# Patient Record
Sex: MALE | Race: OTHER | URBAN - METROPOLITAN AREA
[De-identification: names, ages, dates, MRNs, and addresses within clinical notes are randomized per-mention and may not be internally consistent; named-entity substitution may affect disease eponyms.]

---

## 2021-10-25 RX ORDER — AMLODIPINE, VALSARTAN AND HYDROCHLOROTHIAZIDE 5; 160; 12.5 MG/1; MG/1; MG/1
TABLET ORAL
COMMUNITY
End: 2021-10-26

## 2021-10-26 ENCOUNTER — APPOINTMENT (OUTPATIENT)
Dept: CT IMAGING | Facility: HOSPITAL | Age: 61
DRG: 066 | End: 2021-10-26
Attending: EMERGENCY MEDICINE
Payer: COMMERCIAL

## 2021-10-26 ENCOUNTER — HOSPITAL ENCOUNTER (INPATIENT)
Facility: HOSPITAL | Age: 61
LOS: 6 days | Discharge: HOME | DRG: 066 | End: 2021-11-01
Attending: EMERGENCY MEDICINE | Admitting: INTERNAL MEDICINE
Payer: COMMERCIAL

## 2021-10-26 ENCOUNTER — APPOINTMENT (OUTPATIENT)
Dept: CV DIAGNOSTICS | Facility: HOSPITAL | Age: 61
DRG: 066 | End: 2021-10-26
Attending: HOSPITALIST
Payer: COMMERCIAL

## 2021-10-26 DIAGNOSIS — I63.9 CEREBELLAR STROKE (HCC): Primary | ICD-10-CM

## 2021-10-26 PROCEDURE — 99223 1ST HOSP IP/OBS HIGH 75: CPT | Performed by: OTHER

## 2021-10-26 PROCEDURE — 93306 TTE W/DOPPLER COMPLETE: CPT | Performed by: HOSPITALIST

## 2021-10-26 PROCEDURE — 70450 CT HEAD/BRAIN W/O DYE: CPT | Performed by: EMERGENCY MEDICINE

## 2021-10-26 RX ORDER — HYDRALAZINE HYDROCHLORIDE 20 MG/ML
10 INJECTION INTRAMUSCULAR; INTRAVENOUS EVERY 2 HOUR PRN
Status: DISCONTINUED | OUTPATIENT
Start: 2021-10-26 | End: 2021-11-01

## 2021-10-26 RX ORDER — ATORVASTATIN CALCIUM 80 MG/1
80 TABLET, FILM COATED ORAL NIGHTLY
Status: DISCONTINUED | OUTPATIENT
Start: 2021-10-26 | End: 2021-11-01

## 2021-10-26 RX ORDER — MECLIZINE HYDROCHLORIDE 25 MG/1
50 TABLET ORAL ONCE
Status: COMPLETED | OUTPATIENT
Start: 2021-10-26 | End: 2021-10-26

## 2021-10-26 RX ORDER — ONDANSETRON 2 MG/ML
4 INJECTION INTRAMUSCULAR; INTRAVENOUS EVERY 6 HOURS PRN
Status: DISCONTINUED | OUTPATIENT
Start: 2021-10-26 | End: 2021-10-26

## 2021-10-26 RX ORDER — ASPIRIN 81 MG/1
324 TABLET, CHEWABLE ORAL ONCE
Status: COMPLETED | OUTPATIENT
Start: 2021-10-26 | End: 2021-10-26

## 2021-10-26 RX ORDER — ACETAMINOPHEN 325 MG/1
650 TABLET ORAL EVERY 6 HOURS PRN
Status: DISCONTINUED | OUTPATIENT
Start: 2021-10-26 | End: 2021-10-26

## 2021-10-26 RX ORDER — DOCUSATE SODIUM 100 MG/1
100 CAPSULE, LIQUID FILLED ORAL 2 TIMES DAILY PRN
Status: DISCONTINUED | OUTPATIENT
Start: 2021-10-26 | End: 2021-11-01

## 2021-10-26 RX ORDER — ASPIRIN 300 MG
300 SUPPOSITORY, RECTAL RECTAL DAILY
Status: DISCONTINUED | OUTPATIENT
Start: 2021-10-27 | End: 2021-11-01

## 2021-10-26 RX ORDER — ONDANSETRON 2 MG/ML
4 INJECTION INTRAMUSCULAR; INTRAVENOUS EVERY 4 HOURS PRN
Status: DISCONTINUED | OUTPATIENT
Start: 2021-10-26 | End: 2021-10-26

## 2021-10-26 RX ORDER — ONDANSETRON 2 MG/ML
4 INJECTION INTRAMUSCULAR; INTRAVENOUS EVERY 6 HOURS PRN
Status: DISCONTINUED | OUTPATIENT
Start: 2021-10-26 | End: 2021-11-01

## 2021-10-26 RX ORDER — ASPIRIN 325 MG
325 TABLET ORAL DAILY
Status: DISCONTINUED | OUTPATIENT
Start: 2021-10-27 | End: 2021-11-01

## 2021-10-26 RX ORDER — PROCHLORPERAZINE EDISYLATE 5 MG/ML
5 INJECTION INTRAMUSCULAR; INTRAVENOUS EVERY 8 HOURS PRN
Status: DISCONTINUED | OUTPATIENT
Start: 2021-10-26 | End: 2021-11-01

## 2021-10-26 RX ORDER — HEPARIN SODIUM 5000 [USP'U]/ML
5000 INJECTION, SOLUTION INTRAVENOUS; SUBCUTANEOUS EVERY 8 HOURS SCHEDULED
Status: DISCONTINUED | OUTPATIENT
Start: 2021-10-26 | End: 2021-11-01

## 2021-10-26 RX ORDER — SODIUM CHLORIDE 9 MG/ML
INJECTION, SOLUTION INTRAVENOUS CONTINUOUS
Status: ACTIVE | OUTPATIENT
Start: 2021-10-26 | End: 2021-10-28

## 2021-10-26 RX ORDER — POTASSIUM CHLORIDE 20 MEQ/1
40 TABLET, EXTENDED RELEASE ORAL EVERY 4 HOURS
Status: COMPLETED | OUTPATIENT
Start: 2021-10-26 | End: 2021-10-26

## 2021-10-26 RX ORDER — METOCLOPRAMIDE HYDROCHLORIDE 5 MG/ML
10 INJECTION INTRAMUSCULAR; INTRAVENOUS EVERY 8 HOURS PRN
Status: DISCONTINUED | OUTPATIENT
Start: 2021-10-26 | End: 2021-10-26

## 2021-10-26 RX ORDER — AMLODIPINE BESYLATE 5 MG/1
5 TABLET ORAL DAILY
Status: ON HOLD | COMMUNITY
End: 2021-10-26

## 2021-10-26 RX ORDER — ACETAMINOPHEN 325 MG/1
650 TABLET ORAL EVERY 4 HOURS PRN
Status: DISCONTINUED | OUTPATIENT
Start: 2021-10-26 | End: 2021-11-01

## 2021-10-26 RX ORDER — SODIUM CHLORIDE 9 MG/ML
INJECTION, SOLUTION INTRAVENOUS CONTINUOUS
Status: ACTIVE | OUTPATIENT
Start: 2021-10-26 | End: 2021-10-26

## 2021-10-26 RX ORDER — ACETAMINOPHEN 650 MG/1
650 SUPPOSITORY RECTAL EVERY 4 HOURS PRN
Status: DISCONTINUED | OUTPATIENT
Start: 2021-10-26 | End: 2021-11-01

## 2021-10-26 RX ORDER — LABETALOL HYDROCHLORIDE 5 MG/ML
10 INJECTION, SOLUTION INTRAVENOUS EVERY 10 MIN PRN
Status: DISCONTINUED | OUTPATIENT
Start: 2021-10-26 | End: 2021-11-01

## 2021-10-26 NOTE — SLP NOTE
ADULT SWALLOWING EVALUATION    ASSESSMENT    ASSESSMENT/OVERALL IMPRESSION:  Patient seen for swallowing evaluation per stroke protocol. Patient admitted due to headache and feeling off balance.   He reports symptoms began 4 days ago while in [facility].  He d could represent acute or subacute infarcts. Antoni Donning is no intracranial hemorrhage.  Further evaluation with    MRI is recommended.       Preliminary report was reviewed and there is no significant discrepancy.               Dictated by (CST): Huber Roberts,

## 2021-10-26 NOTE — PLAN OF CARE
Received from ED earlier. Alert, awake. C/o dizziness, blurry vision and tingling to bilateral hand fingers. Denies pain. Tele SB with rates in mid 46s.  Per patient stopped taking prescribed b/p medication months ago as he felt he did not need it anymore

## 2021-10-26 NOTE — ED QUICK NOTES
Pt asleep but easily aroused, pt with clear speech, answering questions appropriately, skin w/d,resps appear unlabored. Pt with empty bag of NS hanging, tubing removed. Pt states he is hungry. Pt aware he will be going to floor shortly.

## 2021-10-26 NOTE — ED PROVIDER NOTES
Patient Seen in: [facility] Emergency Department      History   Patient presents with:  Headache    Stated Complaint: headache, unsteady on feet    Subjective:   HPI    77-year-old male presents reporting a headache and dizziness.  Symptoms began 4 d Sclerae anicteric. Conjunctivae show no pallor. Oropharynx clear, mucous membranes moist   Neck: supple, no rigidity   Lungs: good air exchange and clear   Heart: regular rate rhythm and no murmur   Abdomen: Soft and nontender. No abdominal masses.   No mass-effect, or abnormal extra-axial fluid collections. This is as interpreted by Dr. Elex Rubinstein MD from vision radiology           MDM      25-year-old male presents with 4 days of dizziness that he describes as spinning.  He also reports a frontal headache

## 2021-10-26 NOTE — PROGRESS NOTES
[id number] [facility] Neurology Preliminary Note    Estefany Aguilera Patient Status:  Inpatient    1960 MRN [id number]   [facility] 0SW-A Attending Suzy Burgess MD   Hosp Day # 0 PCP Laure Pereira MD     [facility] rectal suppository 650 mg, 650 mg, Rectal, Q4H PRN  Labetalol HCl (TRANDATE) injection 10 mg, 10 mg, Intravenous, Q10 Min PRN  hydrALAzine HCl (APRESOLINE) injection 10 mg, 10 mg, Intravenous, Q2H PRN  docusate sodium (COLACE) cap 100 mg, 100 mg, Oral, BID  Findings could represent acute or subacute infarcts.  There is no intracranial hemorrhage.  Further evaluation with    MRI is recommended.        Impression    Bilateral cerebellar infarcts R>L on CT   Hypertension     Plan:   Ischemic stroke order set in

## 2021-10-26 NOTE — PAYOR COMM NOTE
--------------  ADMISSION REVIEW     Payor: ELZA LAY  Subscriber #:  [id number]  Authorization Number: [id number]    Admit date: 10/26/21  Admit time:  9:44 AM     Patient Seen in: [facility] Emergency Department    History   Stated Complaint: heada nonfocal. Normal finger-to-nose. Pupils equal, round, reactive bilaterally. Extraocular movement intact. No facial droop. No slurred speech. Normal strength and sensation throughout.     Labs Reviewed   COMP METABOLIC PANEL (14) - Abnormal; Notable for the agrees with plan for hospitalization. Neurology placed on consult for evaluation. Disposition and Plan   Clinical Impression:  Cerebellar stroke ([facility] [address]  (primary encounter diagnosis)        NURSING:  Alert, awake.  C/o dizziness, blurry vision and tingli III, IV, VI: EOMI, no nystagmus  CN V: Facial sensation normal    CN VII: Symmetric facial movement   CN VIII: Normal hearing   CN IX, XI: uvula and palate elevate symmetrically    CN XI: shoulder shrug equal    CN XII: tongue midline  Motor: No drift, no Ivana Chowdhury RN      sodium chloride 0.9% IV bolus 1,000 mL     Date Action Dose Route User    10/26/2021 0239 New Bag 1,000 mL Intravenous Doyce TAMIA Neal      sodium chloride 0.9% IV bolus 500 mL     Date Action Dose Route User    10/26/2021 1

## 2021-10-26 NOTE — H&P
[facility] Hospitalist History and Physical      Patient presents with:  Headache       PCP: Omayra Freeman MD      History of Present Illness: Patient is a 59year old male with PMH sig for HTN, BPH presents for eval of HA and dizziness.       Sx started 4 day distended   Extremities: Extremities normal, atraumatic, no cyanosis or edema. Skin: Skin color, texture, turgor normal. No rashes or lesions.     Neurologic: Normal strength, no focal deficit appreciated     Data Review:    LABS:   Lab Results   Componen hemispheres bilaterally is noted greater on the right than the left. Findings could represent acute or subacute infarcts. There is no intracranial hemorrhage. Further evaluation with  MRI is recommended.   Preliminary report was reviewed and there is no

## 2021-10-26 NOTE — CONSULTS
[id number] [facility] Neurology Initial Consultation    Iqrachristian Retana Patient Status:  Inpatient    1960 MRN [id number]   [facility] 0SW-A Attending Ann Wright MD   Hosp Day # 0 PCP Norma Carmen MD     [facility] Q8H [address]  Prochlorperazine Edisylate (COMPAZINE) injection 5 mg, 5 mg, Intravenous, Q8H PRN  0.9% NaCl infusion, , Intravenous, Continuous  acetaminophen (TYLENOL) tab 650 mg, 650 mg, Oral, Q4H PRN   Or  acetaminophen (Tylenol) rectal suppository 650 mg, 65 attenuation in inferior cerebellar hemispheres bilaterally is noted greater on the right than the left.  Findings could represent acute or subacute infarcts. Annabelle Dragon is no intracranial hemorrhage.  Further evaluation with    MRI is recommended.      CT BRAIN

## 2021-10-27 ENCOUNTER — APPOINTMENT (OUTPATIENT)
Dept: MRI IMAGING | Facility: HOSPITAL | Age: 61
DRG: 066 | End: 2021-10-27
Attending: HOSPITALIST
Payer: COMMERCIAL

## 2021-10-27 PROCEDURE — 70549 MR ANGIOGRAPH NECK W/O&W/DYE: CPT | Performed by: HOSPITALIST

## 2021-10-27 PROCEDURE — 70553 MRI BRAIN STEM W/O & W/DYE: CPT | Performed by: HOSPITALIST

## 2021-10-27 PROCEDURE — 70544 MR ANGIOGRAPHY HEAD W/O DYE: CPT | Performed by: HOSPITALIST

## 2021-10-27 PROCEDURE — 99233 SBSQ HOSP IP/OBS HIGH 50: CPT | Performed by: OTHER

## 2021-10-27 RX ORDER — SODIUM CHLORIDE 9 MG/ML
INJECTION, SOLUTION INTRAVENOUS
Status: COMPLETED | OUTPATIENT
Start: 2021-10-28 | End: 2021-10-28

## 2021-10-27 NOTE — PLAN OF CARE
Assumed care at [address]ny Guzman pt on bed, on room air  Still with blurry vision, denies tingling sensation on fingers  IVF.   K+replaced  Need MRI/MRA head  PT/OT to see  Problem: Patient/Family Goals  Goal: Patient/Family Long Term Goal  Description: Patient's

## 2021-10-27 NOTE — CONSULTS
[facility] Cardiology Consultation    Amanda Cagle Patient Status:  Inpatient    1960 MRN [id number]   [facility] 0SW-A Attending Valentino Papas, *   Hosp Day # 1 PCP Jasmyn Griffin MD     Cardiology attending:  Seen and examined. 123/68    Last 3 Weights  10/25/21 2035 : 135 lb (61.2 kg)          General: No apparent distress  Neck: supple. JVP normal  Cardiac: Regular rhythm, S1, S2 normal,   No rub or gallop. No murmur. Lungs: CTA  Abdomen: Soft, non-tender.    Extremities: No e services    Mild transaminitis   - trend LFT's   - managed per Hospitalist services    Plan:  - OBDULIO tomorrow. We spoke at length regarding the need for transesophageal echocardiography.   The risks were discussed and included but not limited to those relat

## 2021-10-27 NOTE — PHYSICAL THERAPY NOTE
PHYSICAL THERAPY EVALUATION - INPATIENT     Room Number: 0012/0012-A  Evaluation Date: 10/27/2021  Type of Evaluation: Initial  Physician Order: PT Eval and Treat    Presenting Problem: Headache, dizziness, impaired balance  Reason for Therapy: Mobil Unable to rate  Location: headache  Management Techniques:  Activity promotion;Repositioning    COGNITION  · Overall Cognitive Status:  CAMILLE - unable to assess  · Orientation Level:  oriented x4  · Following Commands:  follows multistep commands with increas steps with a railing?: A Lot       AM-PAC Score:  Raw Score: 17   Approx Degree of Impairment: 50.57%   Standardized Score (AM-PAC Scale): 42.13   CMS Modifier (G-Code): CK    FUNCTIONAL ABILITY STATUS  Gait Assessment   Gait Assistance: Minimum assistance BPH  .  In this PT evaluation, the patient presents with the following impairments headache, decreased balance, coordination, vision. Functional outcome measures completed include [facility] modified qamar.   Based on this evaluation, patient's clinical presen

## 2021-10-27 NOTE — PLAN OF CARE
Pt A&Ox4, VSS, afebrile, denies pain. Neuro checks q4hr, pt c/o dizziness, tingling to bilateral hands and fingers, NIH 1. Pt had MRI/MRA brain today, results called to neurology. Pt evaluated by PT/OT today, recommend acute rehab.  IVF infusing, stroke pro

## 2021-10-27 NOTE — CM/SW NOTE
SW met with pt and spoke to him using the ALDO Steward- primary language in [place] Islander. Explained role of sw and plan for [place] and he is in agreement. 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   His brother will be here tomorrow and

## 2021-10-27 NOTE — PROGRESS NOTES
[id number] [facility] Neurology Progress Note    Christina Davis Patient Status:  Inpatient    1960 MRN [id number]   [facility] 0SW-A Attending Anum Milligan, *   Hosp Day # 1 PCP Omayra Freeman MD     Subjective:  Viral Santana light touch, pinprick, vibration and proprioception  Coord: FNF intact with no tremor or dysmetria; rapid alternating movements slower on right side but no ataxia noted  Romberg:deferred  Gait: deferred    Labs:  Reviewed in EPIC;   Lab Results   Component attenuation in inferior cerebellar hemispheres bilaterally is noted greater on the right than the left. Findings could represent acute or subacute infarcts. There is no intracranial hemorrhage. Further evaluation with  MRI is recommended.   Preliminary r by provocative maneuvers. Impressions:  No previous study was available for comparison.             Assessment:    Acute ischemic stroke: bilateral cerebellar stroke, with bilateral PICA stenosis vs occlusion, but patent basilar and patent vertebral - s

## 2021-10-27 NOTE — CM/SW NOTE
PT/OT rec Acute rehab- pt admitted to [facility] due to new stroke. PMR consulted. HOME SITUATION  Type of Home: House   Home Layout: Two level  Stairs to Bedroom:  (flight)  Railing:  Yes     Lives With: Alone  Drives: Yes  Patient Owned Equipment: None  Pa

## 2021-10-27 NOTE — OCCUPATIONAL THERAPY NOTE
OCCUPATIONAL THERAPY EVALUATION - INPATIENT     Room Number: 0012/0012-A  Evaluation Date: 10/27/2021  Type of Evaluation: Initial  Presenting Problem: Cerebellar stroke    Physician Order: IP Consult to Occupational Therapy  Reason for Therapy: ADL/IADL D Findings could represent acute or subacute infarcts. There is no intracranial hemorrhage. Further evaluation with  MRI is recommended. Preliminary report was reviewed and there is no significant discrepancy.     Dictated by (CST): Jesús Kate MD on 10/ vision with headache    PERCEPTION  Overall Perception Status:   WFL - within functional limits    SENSATION  reports n/t in angela fingers    Communication: Pt able to communicate basic wants and needs without difficulty.     Behavioral/Emotional/Social: Pt i planning. Pt rec'd supine in bed, pleasant and motivated to participate despite report of headache. Supine<>sit with MIN A. Pt reports dizziness with sitting EOB. BP taken and 138/87.    Pt able to don and tie shoes with MIN assist.  Sit<>stand, function Complexity  Occupational Profile/Medical History MODERATE - Expanded review of history including review of medical or therapy record   Specific performance deficits impacting engagement in ADL/IADL MODERATE  3 - 5 performance deficits   Client Assessment/P

## 2021-10-27 NOTE — PROGRESS NOTES
[facility] Hospitalist Progress Note     PCP: Bong Willard MD    CC:  Follow up    SUBJECTIVE:  Pt sitting up in bed, worked with PT. [place] [facility] while walking around. Mild blurry vision.  Feels off balance when walking    dw TAMIA Pleitez  OBJECTIVE:  Temp:  [97.9 °F metoclopramide       Assessment/Plan:     Principal Problem:    Cerebellar stroke ([address]      # HA/ataxia secondary to   # bilateral cerebellar infarcts R > L  - neuro following, , lipitor   - MRA, MRA noted  -HbA1c ok, lipid panel noted, TTE without

## 2021-10-28 ENCOUNTER — APPOINTMENT (OUTPATIENT)
Dept: CV DIAGNOSTICS | Facility: HOSPITAL | Age: 61
DRG: 066 | End: 2021-10-28
Attending: INTERNAL MEDICINE
Payer: COMMERCIAL

## 2021-10-28 ENCOUNTER — APPOINTMENT (OUTPATIENT)
Dept: INTERVENTIONAL RADIOLOGY/VASCULAR | Facility: HOSPITAL | Age: 61
DRG: 066 | End: 2021-10-28
Attending: INTERNAL MEDICINE
Payer: COMMERCIAL

## 2021-10-28 PROCEDURE — 99233 SBSQ HOSP IP/OBS HIGH 50: CPT | Performed by: OTHER

## 2021-10-28 PROCEDURE — 93325 DOPPLER ECHO COLOR FLOW MAPG: CPT | Performed by: INTERNAL MEDICINE

## 2021-10-28 PROCEDURE — 93320 DOPPLER ECHO COMPLETE: CPT | Performed by: INTERNAL MEDICINE

## 2021-10-28 PROCEDURE — B246ZZ4 ULTRASONOGRAPHY OF RIGHT AND LEFT HEART, TRANSESOPHAGEAL: ICD-10-PCS | Performed by: INTERNAL MEDICINE

## 2021-10-28 RX ORDER — MIDAZOLAM HYDROCHLORIDE 1 MG/ML
INJECTION INTRAMUSCULAR; INTRAVENOUS
Status: COMPLETED
Start: 2021-10-28 | End: 2021-10-28

## 2021-10-28 RX ORDER — MIDAZOLAM HYDROCHLORIDE 1 MG/ML
4 INJECTION INTRAMUSCULAR; INTRAVENOUS ONCE
Status: COMPLETED | OUTPATIENT
Start: 2021-10-28 | End: 2021-10-28

## 2021-10-28 RX ORDER — SODIUM CHLORIDE 9 MG/ML
INJECTION, SOLUTION INTRAVENOUS
Status: ACTIVE | OUTPATIENT
Start: 2021-10-29 | End: 2021-10-29

## 2021-10-28 NOTE — PROGRESS NOTES
[id number] [facility] Neurology Progress Note    Fred Dundas Patient Status:  Inpatient    1960 MRN [id number]   [place] [facility] 0SW-A Attending Wyatt Davenport, *   Hosp Day # 2 PCP Amanda Pacheco MD     Subjective:  Lachelle Del Rosario bilaterally      Neuro:  Mental status:  Orientation: Alert and oriented to person, place, time  Speech fluent and conversational     CN: PERRL, EOMI without nystagmus, VFF, smile symmetric, sensation intact, tongue and palate midline, SCM intact; otherwis Component Value Date    AST 32 10/28/2021    AST 39 (H) 10/26/2021    AST 44 (H) 10/26/2021     Lab Results   Component Value Date    ALT 64 (H) 10/28/2021    ALT 84 (H) 10/26/2021    ALT 94 (H) 10/26/2021       Imaging:   CT BRAIN OR HEAD (87355)    Res abnormalities. 2. Right ventricle: The cavity size was normal. Systolic function was      normal. Systolic pressure was within the normal range. 3. Tricuspid valve: There was trivial regurgitation.    4. Atrial septum: Agitated saline contrast study idalia confusion, double vision, loss of vision, vertigo,     35 total minutes spent, including chart review, discussion of pertinent labs and imaging with care team and patient and family with education regarding risk of stroke and plan of care; patient and fami

## 2021-10-28 NOTE — PROCEDURES
Transesophageal Echocardiogram      History:  Sharon William is a 59year old male with embolic strokes.  The risks and benefits of the procedure (including, but not limited to, airway/esophageal damage, respiratory failure, myocardial infarction, stroke, an

## 2021-10-28 NOTE — PLAN OF CARE
Assumed patient care this morning. Alert, awake. Breathing unlabored. C/o dizziness and blurry vision. Demond completed earlier today.

## 2021-10-28 NOTE — PROGRESS NOTES
[facility] Hospitalist Progress Note     PCP: Chyrl Boas, MD    CC:  Follow up    SUBJECTIVE:  Pt sitting up in bed, dizziness better. Mild blurry vision . Had unremarkable OBDULIO today  dw RN Pricilla  OBJECTIVE:  Temp:  [97.3 °F (36.3 °C)-98.4 °F (36.9 °C)] 97. docusate sodium, ondansetron **OR** [DISCONTINUED] metoclopramide       Assessment/Plan:     Principal Problem:    Cerebellar stroke (HCC)  Active Problems:    Arterial ischemic stroke, vertebrobasilar, cerebellar, acute ([facility] [address]      # HA/ataxia secondary to

## 2021-10-28 NOTE — PAYOR COMM NOTE
--------------  10/28 CONTINUED STAY REVIEW    Payor: [facility]  Subscriber #:  [id number]  Authorization Number: [id number]    DISCHARGE RECOMMENDATIONS  PT Discharge Recommendations: Acute rehabilitation      CARDS:       Transesophageal Echocardiogram  transaminitis- trend- today cmp pending  **mild elevated total bili- fractionate  -pt without complaints of abdominal pain        Hep SQ    MEDICATIONS ADMINISTERED IN LAST 1 DAY:  atorvastatin (LIPITOR) tab 80 mg     Date Action Dose Route User    10/27/2 54 16 129/75 98 % None (Room air) —    10/28/21 0500 98 °F (36.7 °C) 51 16 129/78 97 % None (Room air) —    10/28/21 0100 98.4 °F (36.9 °C) 54 16 127/78 98 % None (Room air) —    10/27/21 2100 98.2 °F (36.8 °C) 67 18 133/66 96 % None (Room air) —

## 2021-10-28 NOTE — CONSULTS
PHYSICAL MEDICINE AND REHABILITATION CONSULTATION     CC: Impaired mobility and ADL dysfunction secondary to stroke    HPI: This is a 25-year-old gentleman with history of hypertension and BPH was admitted to the hospital with 4 days of dizziness blurry vi mL, 1,000 mL, Intravenous, Once  [COMPLETED] aspirin chewable tab 324 mg, 324 mg, Oral, Once  [] 0.9% NaCl infusion, , Intravenous, Continuous  Heparin Sodium (Porcine) 5000 UNIT/ML injection 5,000 Units, 5,000 Units, Subcutaneous, Q8H [address]  Prochlor Pulse 60   Temp 97.3 °F (36.3 °C)   Resp 18   Ht 5' 2.99\" (1.6 m)   Wt 135 lb (61.2 kg)   SpO2 98%   BMI 23.92 kg/m²   General: Well-developed well-nourished gentleman in no painful distress. Pleasant and cooperative.   Head: Normocephalic, atraumatic, wi medical oversight to monitor kidney function, cardiac function, pulmonary status, neurologic status, DVT prevention. ELOS 10-12 days. Rehab potential is good      DC goal is home with family at a independent level.      Post  Rehab discharge expectation

## 2021-10-28 NOTE — PROGRESS NOTES
[facility] Cardiology Progress Note        Iqra Retana Patient Status:  Inpatient    1960 MRN [id number]   [facility] 0SW-A Attending Sylvester Engle, *   Hosp Day # 2 PCP Noram Carmen MD     Subject 10/26/21  0155   TROP <0.045       No results for input(s): PTP, INR in the last 168 hours. Impression:  1. BL cerebellar strokes - OBDULIO today was normal.  No shunt found. 2. Chest pressure - no ischemia suspected. Normal d dimer.         Plan

## 2021-10-28 NOTE — PROGRESS NOTES
OBDULIO at bedside with Dr Safia Benitez. Pt tolerated procedure well. See bedside sedation record. VSS. Suctioned clear white secretions. Pt awake and tolerating po intake well. Report given to Ramya. Pt transported via bed in stable condition without c/o.

## 2021-10-28 NOTE — PLAN OF CARE
Assumed care at [address] pt on the bathroom  Still having dizziness and little off balance  Assisted pt on bed  Still with blurry vision  Denies n/t on fingers  Fall prevention  Neuro Q 4 hrs  Vital signs stable  NPO post MN for OBDULIO  Problem: Patient/Fam safe patient handling equipment as needed  - Ensure adequate protection for wounds/incisions during mobilization  - Obtain PT/OT consults as needed  - Advance activity as appropriate  - Communicate ordered activity level and limitations with patient/family

## 2021-10-29 RX ORDER — MECLIZINE HCL 12.5 MG/1
12.5 TABLET ORAL 3 TIMES DAILY PRN
Status: DISCONTINUED | OUTPATIENT
Start: 2021-10-29 | End: 2021-11-01

## 2021-10-29 RX ORDER — TRAMADOL HYDROCHLORIDE 50 MG/1
50 TABLET ORAL EVERY 6 HOURS PRN
Status: DISCONTINUED | OUTPATIENT
Start: 2021-10-29 | End: 2021-11-01

## 2021-10-29 NOTE — PAYOR COMM NOTE
--------------  10/29 CONTINUED STAY REVIEW    Payor: [employer]  Subscriber #:  [id number]  Authorization Number: [id number]         HOSPITALIST:    SUBJECTIVE:  Pt sitting up in bed, was asleep- arousable to voice- says has HA but better than it was yest hypokalemia-resolved  - replete     **mild transaminitis- trend- downtrended  **mild elevated total bili- fractionate.  Direct bili minimally elevated to 0.4. f/u with PCP  -pt without complaints of abdominal pain        Hep SQ      Dispo: discharge plannin Date/Time Temp Pulse Resp BP SpO2 Weight O2 Device O2 Flow Rate (L/min) [facility]    10/29/21 1130 98.3 °F (36.8 °C) 57 18 143/75 98 % — None (Room air) —     10/29/21 0730 98.1 °F (36.7 °C) 48 20 121/69 95 % — None (Room air) —     10/29/21 0500 98.2 °F (36

## 2021-10-29 NOTE — PLAN OF CARE
PT A/O, SON AT BEDSIDE, C/O OF  DIZZINESS, HEADACHE, 98% ON RA, SR/SB, SCDS ON, VOIDING PER URINAL, GIVEN TYLENOL WITH LITTLE RELIEF, BED ALARM ON, LABS IN AM.    Problem: NEUROLOGICAL - ADULT  Goal: Achieves stable or improved neurological status  Descrip

## 2021-10-29 NOTE — PROGRESS NOTES
[facility] [address] [place] [address] Cardiology Progress Note        Sintia Cha Patient Status:  Inpatient    1960 MRN [id number]   [facility] 0SW-A Attending Florian Reno, *   Hosp Day # 3 PCP Lazaro Montero MD     Subject Recent Labs   Lab 10/26/21  0155   TROP <0.045       Recent Labs   Lab 10/27/21  1236   PTP 13.1                 Impression:  1. BL cerebellar strokes - OBDULIO today was normal.  No shunt found. 2. Chest pressure - no ischemia suspected.   Normal d dime

## 2021-10-29 NOTE — PROGRESS NOTES
[facility] Hospitalist Progress Note     PCP: Jasmyn Griffin MD    CC:  Follow up    SUBJECTIVE:  Pt sitting up in bed, was asleep- arousable to voice- says has HA but better than it was yesterday. Tylenol helped minimally. Vision still better.  No new concern Units Subcutaneous Q8H [facility]   • aspirin  300 mg Rectal Daily    Or   • aspirin  325 mg Oral Daily   • atorvastatin  80 mg Oral Nightly       traMADol, Prochlorperazine Edisylate, acetaminophen **OR** acetaminophen, Labetalol HCl, hydrALAzine HCl, docusate s

## 2021-10-29 NOTE — CM/SW NOTE
I spoke with the patient over the phone regarding discharge planning. Patient states he is agreeable to discharge to [place] [facility] [id number]. I spoke with Sandi Herrera at [facility] [id number] who will submit for insurance authorization.

## 2021-10-29 NOTE — PHYSICAL THERAPY NOTE
PHYSICAL THERAPY TREATMENT NOTE - INPATIENT    Room Number: 0012/0012-A     Session: 1   Number of Visits to Meet Established Goals: 5    Presenting Problem: Headache, dizziness, impaired balance    Problem List  Principal Problem:    Cerebellar stroke (H STATUS  Gait Assessment   Gait Assistance: Supervision  Distance (ft): 75  Assistive Device: Rolling walker (amb last 50' sans walker)  Pattern:  (cues for increased step length and heel strike B)  Stoop/Curb Assistance: Not tested       Skilled Therapy Pr

## 2021-10-29 NOTE — PLAN OF CARE
Pt alert , no change in neuro status. C/O headache. Tramadol given, tylenol given. Ice pack given. Up in chair most of day. Vomited x 1. zofran given. Pt to transfer to floor.

## 2021-10-30 NOTE — PLAN OF CARE
Patient arrived from 0012 to floor this evening. Neuro assessment completed. Patient c/o headache. Tylenol given to patient prior to transfer to the floor. Rates pain now 2/10. Awaiting bed at [facility] rehab.       Problem: Patient/Family Goals  Goal:

## 2021-10-30 NOTE — PLAN OF CARE
PT TRANSFERRED TO 8610, PER BED, REPORT GIVEN TO SILVESTRE PT A/O, C/O OF HEADACHE, DIZZINESS, 98% ON RA, SR, GIVEN TYLENOL FOR HEADACHE, ALL BELONGINGS TAKEN WITH PT.    Problem: NEUROLOGICAL - ADULT  Goal: Achieves stable or improved neurological status  Natalie Coil

## 2021-10-30 NOTE — PLAN OF CARE
Problem: Patient/Family Goals  Goal: Patient/Family Long Term Goal  Description: Patient's Long Term Goal: learn how to cope with walking deficit    Interventions:  - physical therapy  - See additional Care Plan goals for specific interventions  Outcome: patient/family  Outcome: Progressing     Problem: Impaired Functional Mobility  Goal: Achieve highest/safest level of mobility/gait  Description: Interventions:  - Assess patient's functional ability and stability  - Promote increasing activity/tolerance f

## 2021-10-30 NOTE — PROGRESS NOTES
[facility] Hospitalist Progress Note     PCP: Hakeem Ryder MD    CC:  Follow up    SUBJECTIVE:  Pt sitting up in chair, HA minimal. No complaints.     OBJECTIVE:  Temp:  [97.9 °F (36.6 °C)-98.4 °F (36.9 °C)] 98.3 °F (36.8 °C)  Pulse:  [46-63] 53  Resp:  [14-1 traMADol, meclizine, Prochlorperazine Edisylate, acetaminophen **OR** acetaminophen, Labetalol HCl, hydrALAzine HCl, docusate sodium, ondansetron **OR** [DISCONTINUED] metoclopramide       Assessment/Plan:     Principal Problem:    Cerebellar stroke

## 2021-10-30 NOTE — PLAN OF CARE
Pt. Is alert and oriented times four. Lungs clear on auscultation. Pt. Has no c/o pain. He still c/o dizziness and headache but states he is much better. He is sinus rhythm on monitor.

## 2021-10-31 NOTE — PLAN OF CARE
Pt is A/Ox4. Neuro check completed, now once a shift. Room air, lungs clear. NSR/SB. Continent. Up with stand by. Denies any pain. Voiding. All needs met.        Problem: NEUROLOGICAL - ADULT  Goal: Achieves stable or improved neurological status  Descripti

## 2021-10-31 NOTE — PROGRESS NOTES
[facility] Hospitalist Progress Note     PCP: Karlene Claudio MD    CC:  Follow up    SUBJECTIVE:  Pt sitting up in chair, HA minimal. No complaints.  Awaiting MJ    OBJECTIVE:  Temp:  [98.1 °F (36.7 °C)-98.3 °F (36.8 °C)] 98.2 °F (36.8 °C)  Pulse:  Lottie Douglas traMADol, meclizine, Prochlorperazine Edisylate, acetaminophen **OR** acetaminophen, Labetalol HCl, hydrALAzine HCl, docusate sodium, ondansetron **OR** [DISCONTINUED] metoclopramide       Assessment/Plan:     Principal Problem:    Cerebellar stroke

## 2021-10-31 NOTE — PLAN OF CARE
Received pt at 0730. A&o x4. NSR on tele. VSS. Denies chest pain and SOB. WNL on room air. Up SBA. Last BM 10/31 with good urine output. Daily NIH and neuro assessment completed.      Problem: Patient/Family Goals  Goal: Patient/Family Long Term Goal  Descr adequate protection for wounds/incisions during mobilization  - Obtain PT/OT consults as needed  - Advance activity as appropriate  - Communicate ordered activity level and limitations with patient/family  Outcome: Progressing     Problem: Impaired Functio

## 2021-11-01 VITALS
OXYGEN SATURATION: 97 % | BODY MASS INDEX: 23.55 KG/M2 | HEIGHT: 62.99 IN | HEART RATE: 65 BPM | DIASTOLIC BLOOD PRESSURE: 71 MMHG | TEMPERATURE: 98 F | RESPIRATION RATE: 18 BRPM | SYSTOLIC BLOOD PRESSURE: 118 MMHG | WEIGHT: 132.94 LBS

## 2021-11-01 RX ORDER — ATORVASTATIN CALCIUM 80 MG/1
80 TABLET, FILM COATED ORAL NIGHTLY
Qty: 30 TABLET | Refills: 0 | Status: SHIPPED | OUTPATIENT
Start: 2021-11-01 | End: 2021-12-14

## 2021-11-01 RX ORDER — ASPIRIN 325 MG
325 TABLET ORAL DAILY
Qty: 30 TABLET | Refills: 0 | Status: SHIPPED | OUTPATIENT
Start: 2021-11-02

## 2021-11-01 NOTE — PAYOR COMM NOTE
--------------  10-30 - 31 CONTINUED STAY REVIEW    Payor: [employer]ZA [employer]  Subscriber #:  [id number]  Authorization Number: [id number]        10/30:  Pt sitting up in chair, HA minimal. No complaints.   Stable for DC from IM standpoint    AWAITING INSURANCE AUT

## 2021-11-01 NOTE — PHYSICAL THERAPY NOTE
PHYSICAL THERAPY TREATMENT NOTE - INPATIENT    Room Number: 8988/9397-X     Session: 1     Number of Visits to Meet Established Goals: 5    Presenting Problem: Headache, dizziness, impaired balance    ASSESSMENT   The pt is seen for physical therapy gonzález Goals established on 10/27/2021; goals met 10/29/21. Updated on 11/1/2021          SUBJECTIVE  \" I am feeling good now. \"    OBJECTIVE  Precautions: Bed/chair alarm    WEIGHT BEARING RESTRICTION  Weight Bearing Restriction: None                PAIN ASSESS 150'. No dizziness reported. Environmental Barriers:  Stairs: up/down 12 steps with one rail via step to pattern with sup.    Stoop: sup    Modified Trejo assessment done on 11/1/2021  Siting to standing

## 2021-11-01 NOTE — DISCHARGE PLANNING
D/C orders received. IV removed, IV catheter intact. Follow up appointments discussed. New meds prescribed to pharmacy. Pt educated about when to take medications, verbalized understanding. Discharge paperwork given and discussed.    Patient taken t

## 2021-11-01 NOTE — PLAN OF CARE
Pt is A/Ox4. Nuero checks q shift, wnl no change. No numbness/tingling. Tylenol given for headache. Vital signs stable. NSR/SB on tele. Room air, O2 sats wnl. Pt resting comfortably in bed at this time.        Problem: Patient/Family Goals  Goal: Patient/Fa activity tolerance for standing, transferring and ambulating w/ or w/o assistive devices  - Assist with transfers and ambulation using safe patient handling equipment as needed  - Ensure adequate protection for wounds/incisions during mobilization  - Jose Martin Fitch

## 2021-11-01 NOTE — PLAN OF CARE
Assumed care of pt at 0730. Pt A&Ox4. Daily NIH, score 0. Qshift neuro check, WDL. NSR/SB on tele. Lung sounds clear bilaterally on room air. Pt denies pain and SOB. Ambulating in room and hallways with standby assist without difficulty.  Per PT, pt no long devices  Outcome: Progressing     Problem: MUSCULOSKELETAL - ADULT  Goal: Return mobility to safest level of function  Description: INTERVENTIONS:  - Assess patient stability and activity tolerance for standing, transferring and ambulating w/ or w/o assist

## 2021-11-01 NOTE — PROGRESS NOTES
Meredithjunito Starks was under my care at [facility] from 10/26/2021 through 11/1/2021.  Please excuse him from work through 11/4/21      Sammie Reid MD  [facility] Hospitalist  [phone]  11/1/2021  4:55 PM

## 2021-11-02 NOTE — PAYOR COMM NOTE
Discharge Notification    Patient Name: Jay Troy: [employer]  Subscriber #: [id number]  Authorization Number: [id number]  Admit Date/Time: 10/26/2021 1:25 AM  Discharge Date/Time: 11/1/2021 7:15 PM

## 2021-11-10 ENCOUNTER — TELEPHONE (OUTPATIENT)
Dept: NEUROLOGY | Facility: CLINIC | Age: 61
End: 2021-11-10

## 2021-11-10 NOTE — TELEPHONE ENCOUNTER
Per record review, patient's discharge paperwork did not include a recommendation to follow with neuro. Pt agreeable to scheduling a stroke f/u appt with Dr. Beto Collado.   Accepted appt on 11/22/21 at 1:40pm.  Provided office address and phone, and direction

## 2021-11-16 ENCOUNTER — OFFICE VISIT (OUTPATIENT)
Dept: INTERNAL MEDICINE CLINIC | Facility: CLINIC | Age: 61
End: 2021-11-16
Payer: COMMERCIAL

## 2021-11-16 VITALS
BODY MASS INDEX: 28.24 KG/M2 | HEART RATE: 66 BPM | OXYGEN SATURATION: 98 % | DIASTOLIC BLOOD PRESSURE: 74 MMHG | WEIGHT: 184.19 LBS | SYSTOLIC BLOOD PRESSURE: 114 MMHG | HEIGHT: 67.75 IN | RESPIRATION RATE: 12 BRPM | TEMPERATURE: 99 F

## 2021-11-16 DIAGNOSIS — Z12.11 COLON CANCER SCREENING: ICD-10-CM

## 2021-11-16 DIAGNOSIS — H54.7 VISION IMPAIRMENT: ICD-10-CM

## 2021-11-16 DIAGNOSIS — I63.9 CEREBELLAR STROKE (HCC): Primary | ICD-10-CM

## 2021-11-16 DIAGNOSIS — R42 DIZZINESS: ICD-10-CM

## 2021-11-16 DIAGNOSIS — Z23 IMMUNIZATION DUE: ICD-10-CM

## 2021-11-16 DIAGNOSIS — R39.11 BENIGN PROSTATIC HYPERPLASIA WITH URINARY HESITANCY: ICD-10-CM

## 2021-11-16 DIAGNOSIS — R31.29 OTHER MICROSCOPIC HEMATURIA: ICD-10-CM

## 2021-11-16 DIAGNOSIS — R30.0 DYSURIA: ICD-10-CM

## 2021-11-16 DIAGNOSIS — N40.1 BENIGN PROSTATIC HYPERPLASIA WITH URINARY HESITANCY: ICD-10-CM

## 2021-11-16 PROCEDURE — 3074F SYST BP LT 130 MM HG: CPT | Performed by: INTERNAL MEDICINE

## 2021-11-16 PROCEDURE — 3008F BODY MASS INDEX DOCD: CPT | Performed by: INTERNAL MEDICINE

## 2021-11-16 PROCEDURE — 90471 IMMUNIZATION ADMIN: CPT | Performed by: INTERNAL MEDICINE

## 2021-11-16 PROCEDURE — 81003 URINALYSIS AUTO W/O SCOPE: CPT | Performed by: INTERNAL MEDICINE

## 2021-11-16 PROCEDURE — 90686 IIV4 VACC NO PRSV 0.5 ML IM: CPT | Performed by: INTERNAL MEDICINE

## 2021-11-16 PROCEDURE — 3078F DIAST BP <80 MM HG: CPT | Performed by: INTERNAL MEDICINE

## 2021-11-16 PROCEDURE — 99204 OFFICE O/P NEW MOD 45 MIN: CPT | Performed by: INTERNAL MEDICINE

## 2021-11-16 RX ORDER — BLOOD PRESSURE TEST KIT
KIT MISCELLANEOUS
Qty: 1 KIT | Refills: 0 | Status: SHIPPED | OUTPATIENT
Start: 2021-11-16 | End: 2021-12-27 | Stop reason: ALTCHOICE

## 2021-11-16 RX ORDER — TAMSULOSIN HYDROCHLORIDE 0.4 MG/1
0.4 CAPSULE ORAL DAILY
Qty: 90 CAPSULE | Refills: 0 | Status: SHIPPED | OUTPATIENT
Start: 2021-11-16 | End: 2021-12-14 | Stop reason: DRUGHIGH

## 2021-11-16 NOTE — H&P
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    H 7.75\" (1.721 m). Weight as of this encounter: 184 lb 3.2 oz (83.6 kg).   PHYSICAL EXAM:   Eyes: pupils equal and reactive; EOMI; sclera normal; conjunctiva normal   Respiratory: no increased work of breathing; good air exchange; CTAB; no crackles or whe

## 2021-11-22 ENCOUNTER — OFFICE VISIT (OUTPATIENT)
Dept: NEUROLOGY | Facility: CLINIC | Age: 61
End: 2021-11-22
Payer: COMMERCIAL

## 2021-11-22 VITALS
BODY MASS INDEX: 28.21 KG/M2 | DIASTOLIC BLOOD PRESSURE: 66 MMHG | SYSTOLIC BLOOD PRESSURE: 136 MMHG | HEART RATE: 71 BPM | RESPIRATION RATE: 16 BRPM | WEIGHT: 184 LBS | HEIGHT: 67.75 IN

## 2021-11-22 DIAGNOSIS — R26.81 GAIT INSTABILITY: ICD-10-CM

## 2021-11-22 DIAGNOSIS — R51.9 NEW ONSET HEADACHE: ICD-10-CM

## 2021-11-22 DIAGNOSIS — I63.29 ARTERIAL ISCHEMIC STROKE, VERTEBROBASILAR, CEREBELLAR, ACUTE (HCC): Primary | ICD-10-CM

## 2021-11-22 PROCEDURE — 99214 OFFICE O/P EST MOD 30 MIN: CPT | Performed by: OTHER

## 2021-11-22 PROCEDURE — 3078F DIAST BP <80 MM HG: CPT | Performed by: OTHER

## 2021-11-22 PROCEDURE — 3008F BODY MASS INDEX DOCD: CPT | Performed by: OTHER

## 2021-11-22 PROCEDURE — 3075F SYST BP GE 130 - 139MM HG: CPT | Performed by: OTHER

## 2021-11-22 NOTE — PROGRESS NOTES
[facility] Progress Note    HPI  Patient presents with:  Stroke: 10/26/21.  Follow up      Dena Lindsey is a 59year old male with PMHx significant for HTN, who presented to ED with bilateral cerebellar stroke and was noted to have bila cory voraaderluiz a kim proxima kenroy., Disp: 1 kit, Rfl: 0  •  tamsulosin (FLOMAX) cap, Take 1 capsule (0.4 mg total) by mouth daily. , Disp: 90 capsule, Rfl: 0  •  aspirin 325 MG Oral Tab, Take 1 tablet (325 mg total) by mouth daily. , Disp: 30 tablet, Rfl: against phospholipid binding proteins.  . . .   PT      12.2 - 14.5 seconds 13.1   APTT      25.4 - 36.1 seconds 27.5   STACLOT LUPUS ANTICOAGULANT      Negative Negative   DRVVT RATIO      0.00 - 1.29 0.91   DRVVT LUPUS ANTICOAGULANT      Negative Negative PROTEIN C, TOTAL ANTIGEN      63 - 153 %    ANTITHROMBIN, ANTIGEN      82 - 136 %    Factor VIII Activity      56 - 191 %    Protein C Functional      83 - 168 %    Protein S, Functional      66 - 143 %    Antithrombin, Enzymatic (Activity)      76 - 128 segment is noted.  Takeoff of the posterior inferior cerebellar arteries is not visualized.       MRA NECK   There is a 3-vessel aortic arch.  The origins of the branch vessels appear widely patent.  The bilateral subclavian arteries and innominate artery was available for comparison. OBDULIO (10/28/2021):     1. Normal LV function  2. No evidence of left atrial or LA appendage thrombus  3. Normal appearing right heart  4. Normal appearing mitral valve.  mild mitral regurgitation  5.  Normal appearing aortic Neurology  [place] [address]  Pager [phone]  11/22/2021

## 2021-12-14 ENCOUNTER — OFFICE VISIT (OUTPATIENT)
Dept: INTERNAL MEDICINE CLINIC | Facility: CLINIC | Age: 61
End: 2021-12-14
Payer: COMMERCIAL

## 2021-12-14 VITALS
RESPIRATION RATE: 20 BRPM | HEART RATE: 71 BPM | TEMPERATURE: 99 F | OXYGEN SATURATION: 99 % | WEIGHT: 186.38 LBS | DIASTOLIC BLOOD PRESSURE: 84 MMHG | HEIGHT: 67.5 IN | BODY MASS INDEX: 28.91 KG/M2 | SYSTOLIC BLOOD PRESSURE: 142 MMHG

## 2021-12-14 DIAGNOSIS — Z86.73 HISTORY OF CVA (CEREBROVASCULAR ACCIDENT): ICD-10-CM

## 2021-12-14 DIAGNOSIS — N40.1 BENIGN PROSTATIC HYPERPLASIA WITH URINARY HESITANCY: ICD-10-CM

## 2021-12-14 DIAGNOSIS — I10 HYPERTENSION, UNSPECIFIED TYPE: Primary | ICD-10-CM

## 2021-12-14 DIAGNOSIS — R39.11 BENIGN PROSTATIC HYPERPLASIA WITH URINARY HESITANCY: ICD-10-CM

## 2021-12-14 PROCEDURE — 3008F BODY MASS INDEX DOCD: CPT | Performed by: INTERNAL MEDICINE

## 2021-12-14 PROCEDURE — 3077F SYST BP >= 140 MM HG: CPT | Performed by: INTERNAL MEDICINE

## 2021-12-14 PROCEDURE — 99214 OFFICE O/P EST MOD 30 MIN: CPT | Performed by: INTERNAL MEDICINE

## 2021-12-14 PROCEDURE — 3079F DIAST BP 80-89 MM HG: CPT | Performed by: INTERNAL MEDICINE

## 2021-12-14 RX ORDER — TAMSULOSIN HYDROCHLORIDE 0.4 MG/1
0.8 CAPSULE ORAL NIGHTLY
Qty: 180 CAPSULE | Refills: 3 | Status: SHIPPED | OUTPATIENT
Start: 2021-12-14

## 2021-12-14 RX ORDER — LISINOPRIL 10 MG/1
10 TABLET ORAL DAILY
Qty: 90 TABLET | Refills: 1 | Status: SHIPPED | OUTPATIENT
Start: 2021-12-14

## 2021-12-14 RX ORDER — ATORVASTATIN CALCIUM 80 MG/1
80 TABLET, FILM COATED ORAL NIGHTLY
Qty: 90 TABLET | Refills: 3 | Status: SHIPPED | OUTPATIENT
Start: 2021-12-14 | End: 2021-12-27

## 2021-12-14 NOTE — PATIENT INSTRUCTIONS
Debe capri kim presion todos los conklin y escribir en un cuaderno. Debe traer Cherre Boga cuaderno a kim proxima kenroy. llame en 1 semana con los numeros de presion.

## 2021-12-14 NOTE — PROGRESS NOTES
Subjective:   Erik De Los Santos is a 59year old male with past medical history of CVA, HTN, preDM, who presents for Follow - Up (1-month follow-up)     HTN- elevated today. Reports that lately at home BP has 140s-150s/80s. Hx CVA- has followed with neuro. good air exchange; CTAB; no crackles or wheezing   Cardiovascular: RRR; S1, S2; no murmurs; no edema   Neurological: awake, alert, oriented x3; CNII-XII grossly intact;    Behavioral/Psych: euthymic; appropriate affect       Assessment & Plan:   (I10) Hyper

## 2021-12-22 ENCOUNTER — HOSPITAL ENCOUNTER (OUTPATIENT)
Dept: CT IMAGING | Age: 61
Discharge: HOME | End: 2021-12-22
Attending: Other
Payer: COMMERCIAL

## 2021-12-22 DIAGNOSIS — R51.9 NEW ONSET HEADACHE: ICD-10-CM

## 2021-12-22 DIAGNOSIS — R26.81 GAIT INSTABILITY: ICD-10-CM

## 2021-12-22 DIAGNOSIS — I63.29 ARTERIAL ISCHEMIC STROKE, VERTEBROBASILAR, CEREBELLAR, ACUTE (HCC): ICD-10-CM

## 2021-12-22 PROCEDURE — 70450 CT HEAD/BRAIN W/O DYE: CPT | Performed by: OTHER

## 2021-12-27 ENCOUNTER — OFFICE VISIT (OUTPATIENT)
Dept: NEUROLOGY | Facility: CLINIC | Age: 61
End: 2021-12-27
Payer: COMMERCIAL

## 2021-12-27 VITALS — WEIGHT: 186 LBS | RESPIRATION RATE: 16 BRPM | BODY MASS INDEX: 28.85 KG/M2 | HEIGHT: 67.5 IN

## 2021-12-27 DIAGNOSIS — E78.5 HYPERLIPIDEMIA LDL GOAL <70: ICD-10-CM

## 2021-12-27 DIAGNOSIS — Z86.73 HISTORY OF STROKE: Primary | ICD-10-CM

## 2021-12-27 PROCEDURE — 99213 OFFICE O/P EST LOW 20 MIN: CPT | Performed by: OTHER

## 2021-12-27 PROCEDURE — 3008F BODY MASS INDEX DOCD: CPT | Performed by: OTHER

## 2021-12-27 RX ORDER — ATORVASTATIN CALCIUM 40 MG/1
40 TABLET, FILM COATED ORAL NIGHTLY
Qty: 30 TABLET | Refills: 5 | Status: SHIPPED | OUTPATIENT
Start: 2021-12-27

## 2021-12-27 NOTE — PROGRESS NOTES
[facility] Progress Note    HPI  Patient presents with:  Stroke:  Follow up and has heart paplpitation,cough and a warm feeling radiating to the brain      Darryl Reyes is a 59year old male with PMHx significant for HTN, who presented tamsulosin (FLOMAX) cap, Take 2 capsules (0.8 mg total) by mouth at bedtime. , Disp: 180 capsule, Rfl: 3  •  aspirin 325 MG Oral Tab, Take 1 tablet (325 mg total) by mouth daily. , Disp: 30 tablet, Rfl: 0    Review of Systems:  No chest pain or palpitations; Cholesterol, Total      <200 mg/dL    HDL Cholesterol      40 - 59 mg/dL    Triglycerides      30 - 149 mg/dL    LDL Cholesterol Calc      <100 mg/dL    VLDL      0 - 30 mg/dL    NON-HDL CHOLESTEROL      <130 mg/dL    HEMOGLOBIN A1c      <5.7 %    ESTIMA brain (12/22/2021):      FINDINGS:     VENTRICLES/SULCI:  Ventricles and sulci are normal in size.     INTRACRANIAL: Milta Child is no evidence of acute hemorrhage.  There is no mass lesion.  No midline shift.  No abnormal appearance to the cerebrum or brainstem vermis.         No significant abnormal parenchymal gradient susceptibility.         There is no abnormal parenchymal or leptomeningeal enhancement.       There is no restricted diffusion to suggest acute ischemia/infarction.       Hypoplastic right maxill artery territory.       2. There is no evidence of significant stenosis, aneurysmal dilatation or dissection involving the major arterial structures in the head and neck.  However, takeoff of the posterior inferior cerebellar arteries is not visualized.   A shunt/PFO or thrombus noted. He was discharged with recommendation to have 30 day event monitor and was discharged on  mg daily and Lipitor 80 mg daily.      Workup done also for possible hyper-coaguable state with anti-thrombin 3 noted to be low

## 2022-01-17 ENCOUNTER — OFFICE VISIT (OUTPATIENT)
Dept: INTERNAL MEDICINE CLINIC | Facility: CLINIC | Age: 62
End: 2022-01-17
Payer: COMMERCIAL

## 2022-01-17 VITALS
WEIGHT: 189 LBS | HEIGHT: 67.5 IN | DIASTOLIC BLOOD PRESSURE: 82 MMHG | SYSTOLIC BLOOD PRESSURE: 126 MMHG | TEMPERATURE: 98 F | OXYGEN SATURATION: 98 % | RESPIRATION RATE: 16 BRPM | BODY MASS INDEX: 29.32 KG/M2 | HEART RATE: 78 BPM

## 2022-01-17 DIAGNOSIS — Z86.73 HISTORY OF CVA (CEREBROVASCULAR ACCIDENT): ICD-10-CM

## 2022-01-17 DIAGNOSIS — H53.9 VISION CHANGES: ICD-10-CM

## 2022-01-17 DIAGNOSIS — I10 HYPERTENSION, UNSPECIFIED TYPE: Primary | ICD-10-CM

## 2022-01-17 DIAGNOSIS — J34.89 INTERNAL NASAL LESION: ICD-10-CM

## 2022-01-17 DIAGNOSIS — N40.1 BENIGN PROSTATIC HYPERPLASIA WITH URINARY HESITANCY: ICD-10-CM

## 2022-01-17 DIAGNOSIS — R39.11 BENIGN PROSTATIC HYPERPLASIA WITH URINARY HESITANCY: ICD-10-CM

## 2022-01-17 PROCEDURE — 3008F BODY MASS INDEX DOCD: CPT | Performed by: INTERNAL MEDICINE

## 2022-01-17 PROCEDURE — 3074F SYST BP LT 130 MM HG: CPT | Performed by: INTERNAL MEDICINE

## 2022-01-17 PROCEDURE — 99214 OFFICE O/P EST MOD 30 MIN: CPT | Performed by: INTERNAL MEDICINE

## 2022-01-17 PROCEDURE — 3079F DIAST BP 80-89 MM HG: CPT | Performed by: INTERNAL MEDICINE

## 2022-01-17 RX ORDER — FLUTICASONE PROPIONATE 50 MCG
2 SPRAY, SUSPENSION (ML) NASAL DAILY
Qty: 16 G | Refills: 0 | Status: SHIPPED | OUTPATIENT
Start: 2022-01-17

## 2022-01-17 RX ORDER — FINASTERIDE 5 MG/1
5 TABLET, FILM COATED ORAL DAILY
Qty: 90 TABLET | Refills: 3 | Status: SHIPPED | OUTPATIENT
Start: 2022-01-17 | End: 2023-01-12

## 2022-01-17 NOTE — PROGRESS NOTES
Subjective:   Judith Rondon is a 59year old male with past medical history of CVA, HTN, preDM, who presents for Blood Pressure (follow up BP)     HTN-controlled; 110s-120s/60s-70s    BPH- on flomax. Improved, but still with occasional symptoms.      Hx CVA CNII-XII grossly intact;    Behavioral/Psych: euthymic; appropriate affect       Assessment & Plan:   (I10) Hypertension, unspecified type  (primary encounter diagnosis)  Plan:   -continue lisinopril 10 MG Oral Tab  -check bp and record; close follow-up

## 2022-01-22 ENCOUNTER — LAB ENCOUNTER (OUTPATIENT)
Dept: LAB | Age: 62
End: 2022-01-22
Attending: INTERNAL MEDICINE
Payer: COMMERCIAL

## 2022-01-22 DIAGNOSIS — N40.1 BENIGN PROSTATIC HYPERPLASIA WITH URINARY HESITANCY: ICD-10-CM

## 2022-01-22 DIAGNOSIS — R39.11 BENIGN PROSTATIC HYPERPLASIA WITH URINARY HESITANCY: ICD-10-CM

## 2022-01-22 LAB — COMPLEXED PSA SERPL-MCNC: 2.59 NG/ML (ref ?–4)

## 2022-01-22 PROCEDURE — 84153 ASSAY OF PSA TOTAL: CPT | Performed by: INTERNAL MEDICINE

## 2022-02-22 ENCOUNTER — TELEPHONE (OUTPATIENT)
Dept: SURGERY | Facility: CLINIC | Age: 62
End: 2022-02-22

## 2022-02-22 NOTE — TELEPHONE ENCOUNTER
Due to change in Dr. Babita Malik schedule, need to reschedule patient's 3/21/22 appointment. Phoned patient with use of /Srinivas, left voicemail, requesting return call from patient to reschedule his 3/21/22 appointment.

## 2022-03-21 NOTE — TELEPHONE ENCOUNTER
Left detailed message adv.  has an emergency and we need to reschedule his appointment and to call us back to assist him.

## 2022-04-18 ENCOUNTER — OFFICE VISIT (OUTPATIENT)
Dept: INTERNAL MEDICINE CLINIC | Facility: CLINIC | Age: 62
End: 2022-04-18
Payer: COMMERCIAL

## 2022-04-18 VITALS
SYSTOLIC BLOOD PRESSURE: 130 MMHG | TEMPERATURE: 98 F | HEIGHT: 67 IN | OXYGEN SATURATION: 98 % | WEIGHT: 193.81 LBS | BODY MASS INDEX: 30.42 KG/M2 | DIASTOLIC BLOOD PRESSURE: 89 MMHG | RESPIRATION RATE: 16 BRPM | HEART RATE: 68 BPM

## 2022-04-18 DIAGNOSIS — I20.8 ANGINA OF EFFORT (HCC): Primary | ICD-10-CM

## 2022-04-18 DIAGNOSIS — H53.9 VISION CHANGES: ICD-10-CM

## 2022-04-18 DIAGNOSIS — I10 HYPERTENSION, UNSPECIFIED TYPE: ICD-10-CM

## 2022-04-18 DIAGNOSIS — R39.11 BENIGN PROSTATIC HYPERPLASIA WITH URINARY HESITANCY: ICD-10-CM

## 2022-04-18 DIAGNOSIS — N40.1 BENIGN PROSTATIC HYPERPLASIA WITH URINARY HESITANCY: ICD-10-CM

## 2022-04-18 DIAGNOSIS — Z86.73 HISTORY OF CVA (CEREBROVASCULAR ACCIDENT): ICD-10-CM

## 2022-04-18 PROCEDURE — 3008F BODY MASS INDEX DOCD: CPT | Performed by: INTERNAL MEDICINE

## 2022-04-18 PROCEDURE — 3075F SYST BP GE 130 - 139MM HG: CPT | Performed by: INTERNAL MEDICINE

## 2022-04-18 PROCEDURE — 3079F DIAST BP 80-89 MM HG: CPT | Performed by: INTERNAL MEDICINE

## 2022-04-18 PROCEDURE — 99214 OFFICE O/P EST MOD 30 MIN: CPT | Performed by: INTERNAL MEDICINE

## 2022-04-18 PROCEDURE — 93000 ELECTROCARDIOGRAM COMPLETE: CPT | Performed by: INTERNAL MEDICINE

## 2022-06-04 DIAGNOSIS — Z86.73 HISTORY OF CVA (CEREBROVASCULAR ACCIDENT): ICD-10-CM

## 2022-06-04 DIAGNOSIS — E78.5 HYPERLIPIDEMIA LDL GOAL <70: ICD-10-CM

## 2022-06-04 DIAGNOSIS — I10 HYPERTENSION, UNSPECIFIED TYPE: ICD-10-CM

## 2022-06-04 DIAGNOSIS — Z86.73 HISTORY OF STROKE: ICD-10-CM

## 2022-06-06 ENCOUNTER — TELEPHONE (OUTPATIENT)
Dept: INTERNAL MEDICINE CLINIC | Facility: CLINIC | Age: 62
End: 2022-06-06

## 2022-06-06 RX ORDER — LISINOPRIL 10 MG/1
10 TABLET ORAL DAILY
Qty: 90 TABLET | Refills: 2 | Status: SHIPPED | OUTPATIENT
Start: 2022-06-06 | End: 2023-06-14

## 2022-06-06 RX ORDER — ATORVASTATIN CALCIUM 40 MG/1
TABLET, FILM COATED ORAL
Qty: 30 TABLET | Refills: 0 | Status: SHIPPED | OUTPATIENT
Start: 2022-06-06

## 2022-07-26 DIAGNOSIS — Z86.73 HISTORY OF STROKE: ICD-10-CM

## 2022-07-26 DIAGNOSIS — E78.5 HYPERLIPIDEMIA LDL GOAL <70: ICD-10-CM

## 2022-07-27 RX ORDER — ATORVASTATIN CALCIUM 40 MG/1
TABLET, FILM COATED ORAL
Qty: 30 TABLET | Refills: 0 | Status: SHIPPED | OUTPATIENT
Start: 2022-07-27 | End: 2022-11-29

## 2022-11-29 DIAGNOSIS — Z86.73 HISTORY OF STROKE: ICD-10-CM

## 2022-11-29 DIAGNOSIS — E78.5 HYPERLIPIDEMIA LDL GOAL <70: ICD-10-CM

## 2022-11-29 RX ORDER — ATORVASTATIN CALCIUM 40 MG/1
TABLET, FILM COATED ORAL
Qty: 30 TABLET | Refills: 1 | Status: SHIPPED | OUTPATIENT
Start: 2022-11-29

## 2022-12-29 DIAGNOSIS — E78.5 HYPERLIPIDEMIA LDL GOAL <70: ICD-10-CM

## 2022-12-29 DIAGNOSIS — Z86.73 HISTORY OF STROKE: ICD-10-CM

## 2022-12-29 RX ORDER — ATORVASTATIN CALCIUM 40 MG/1
TABLET, FILM COATED ORAL
Qty: 30 TABLET | Refills: 0 | Status: SHIPPED | OUTPATIENT
Start: 2022-12-29

## 2023-01-21 DIAGNOSIS — R39.11 BENIGN PROSTATIC HYPERPLASIA WITH URINARY HESITANCY: ICD-10-CM

## 2023-01-21 DIAGNOSIS — N40.1 BENIGN PROSTATIC HYPERPLASIA WITH URINARY HESITANCY: ICD-10-CM

## 2023-01-23 RX ORDER — FINASTERIDE 5 MG/1
5 TABLET, FILM COATED ORAL DAILY
Qty: 90 TABLET | Refills: 0 | Status: SHIPPED | OUTPATIENT
Start: 2023-01-23

## 2023-01-26 DIAGNOSIS — R39.11 BENIGN PROSTATIC HYPERPLASIA WITH URINARY HESITANCY: ICD-10-CM

## 2023-01-26 DIAGNOSIS — N40.1 BENIGN PROSTATIC HYPERPLASIA WITH URINARY HESITANCY: ICD-10-CM

## 2023-01-26 NOTE — TELEPHONE ENCOUNTER
Pt requesting refill asap.    tamsulosin 0.4 MG Oral Cap    [facility] [id number], [address] [facility]  [address] [facility] [phone], [phone]

## 2023-01-27 RX ORDER — TAMSULOSIN HYDROCHLORIDE 0.4 MG/1
0.8 CAPSULE ORAL NIGHTLY
Qty: 180 CAPSULE | Refills: 0 | Status: SHIPPED | OUTPATIENT
Start: 2023-01-27

## 2023-02-22 DIAGNOSIS — Z86.73 HISTORY OF STROKE: ICD-10-CM

## 2023-02-22 DIAGNOSIS — E78.5 HYPERLIPIDEMIA LDL GOAL <70: ICD-10-CM

## 2023-02-23 RX ORDER — ATORVASTATIN CALCIUM 40 MG/1
TABLET, FILM COATED ORAL
Qty: 30 TABLET | Refills: 0 | OUTPATIENT
Start: 2023-02-23

## 2023-02-23 NOTE — TELEPHONE ENCOUNTER
Pharmacy calling about prescription. Informed pharmacist that patient has not been seen in the office since 12/2021 and prescription will not be filled. Stated they will notify patient. Medication refused.

## 2023-03-21 DIAGNOSIS — E78.5 HYPERLIPIDEMIA LDL GOAL <70: ICD-10-CM

## 2023-03-21 DIAGNOSIS — Z86.73 HISTORY OF STROKE: ICD-10-CM

## 2023-03-21 RX ORDER — ATORVASTATIN CALCIUM 40 MG/1
TABLET, FILM COATED ORAL
Qty: 30 TABLET | Refills: 0 | OUTPATIENT
Start: 2023-03-21

## 2023-04-04 ENCOUNTER — OFFICE VISIT (OUTPATIENT)
Dept: INTERNAL MEDICINE CLINIC | Facility: CLINIC | Age: 63
End: 2023-04-04
Payer: COMMERCIAL

## 2023-04-04 VITALS
TEMPERATURE: 98 F | OXYGEN SATURATION: 98 % | SYSTOLIC BLOOD PRESSURE: 120 MMHG | HEIGHT: 67.8 IN | WEIGHT: 188 LBS | HEART RATE: 76 BPM | RESPIRATION RATE: 15 BRPM | DIASTOLIC BLOOD PRESSURE: 68 MMHG | BODY MASS INDEX: 28.83 KG/M2

## 2023-04-04 DIAGNOSIS — J31.0 RHINITIS, UNSPECIFIED TYPE: ICD-10-CM

## 2023-04-04 DIAGNOSIS — E78.5 HYPERLIPIDEMIA LDL GOAL <70: ICD-10-CM

## 2023-04-04 DIAGNOSIS — Z00.00 ANNUAL PHYSICAL EXAM: Primary | ICD-10-CM

## 2023-04-04 DIAGNOSIS — I10 HYPERTENSION, UNSPECIFIED TYPE: ICD-10-CM

## 2023-04-04 DIAGNOSIS — R06.09 DOE (DYSPNEA ON EXERTION): ICD-10-CM

## 2023-04-04 DIAGNOSIS — R73.03 PREDIABETES: ICD-10-CM

## 2023-04-04 DIAGNOSIS — N40.0 BENIGN PROSTATIC HYPERPLASIA, UNSPECIFIED WHETHER LOWER URINARY TRACT SYMPTOMS PRESENT: ICD-10-CM

## 2023-04-04 DIAGNOSIS — Z12.11 COLON CANCER SCREENING: ICD-10-CM

## 2023-04-04 DIAGNOSIS — Z86.73 HISTORY OF CVA (CEREBROVASCULAR ACCIDENT): ICD-10-CM

## 2023-04-04 DIAGNOSIS — H53.9 VISION CHANGES: ICD-10-CM

## 2023-04-04 DIAGNOSIS — R79.89 ELEVATED LFTS: ICD-10-CM

## 2023-04-04 PROCEDURE — 90472 IMMUNIZATION ADMIN EACH ADD: CPT | Performed by: INTERNAL MEDICINE

## 2023-04-04 PROCEDURE — 90471 IMMUNIZATION ADMIN: CPT | Performed by: INTERNAL MEDICINE

## 2023-04-04 PROCEDURE — 3078F DIAST BP <80 MM HG: CPT | Performed by: INTERNAL MEDICINE

## 2023-04-04 PROCEDURE — 3074F SYST BP LT 130 MM HG: CPT | Performed by: INTERNAL MEDICINE

## 2023-04-04 PROCEDURE — 90677 PCV20 VACCINE IM: CPT | Performed by: INTERNAL MEDICINE

## 2023-04-04 PROCEDURE — 3008F BODY MASS INDEX DOCD: CPT | Performed by: INTERNAL MEDICINE

## 2023-04-04 PROCEDURE — 99396 PREV VISIT EST AGE 40-64: CPT | Performed by: INTERNAL MEDICINE

## 2023-04-04 PROCEDURE — 90750 HZV VACC RECOMBINANT IM: CPT | Performed by: INTERNAL MEDICINE

## 2023-04-04 PROCEDURE — 99214 OFFICE O/P EST MOD 30 MIN: CPT | Performed by: INTERNAL MEDICINE

## 2023-04-04 RX ORDER — ATORVASTATIN CALCIUM 40 MG/1
40 TABLET, FILM COATED ORAL NIGHTLY
Qty: 30 TABLET | Refills: 0 | Status: SHIPPED | OUTPATIENT
Start: 2023-04-04

## 2023-04-04 RX ORDER — FLUTICASONE PROPIONATE 50 MCG
2 SPRAY, SUSPENSION (ML) NASAL DAILY
Qty: 18.2 ML | Refills: 2 | Status: SHIPPED | OUTPATIENT
Start: 2023-04-04

## 2023-04-15 ENCOUNTER — HOSPITAL ENCOUNTER (OUTPATIENT)
Age: 63
Discharge: STILL ADMITTED OR ADMINISTRATIVE | End: 2023-04-15
Payer: COMMERCIAL

## 2023-04-15 ENCOUNTER — LAB ENCOUNTER (OUTPATIENT)
Dept: LAB | Age: 63
End: 2023-04-15
Attending: INTERNAL MEDICINE
Payer: COMMERCIAL

## 2023-04-15 DIAGNOSIS — Z00.00 ANNUAL PHYSICAL EXAM: ICD-10-CM

## 2023-04-15 DIAGNOSIS — Z12.11 COLON CANCER SCREENING: ICD-10-CM

## 2023-04-15 LAB — HEMOCCULT STL QL: NEGATIVE

## 2023-04-15 PROCEDURE — 82274 ASSAY TEST FOR BLOOD FECAL: CPT

## 2023-05-10 NOTE — DISCHARGE SUMMARY
[facility] Internal Medicine Discharge Summary   Patient ID:  Barnet Krabbe  [id number]  53 year old  7/25/1960    Admit date: 10/26/2021    Discharge date and time: 11/1/2021     Attending Physician: Soni Yeager MD     Primary Care Physician: Eliazar Swann Rx Refill Request Telephone Encounter    Name:  Florentino Mccall  :  [id number]  Medication Name:  Meloxicam 15mg 90 days            Specific Pharmacy location:  [facility] [place]  Date of last appointment:  n/a  Date of next appointment:  n/a  Best number to reach patient:  [phone]             CTAB, good respiratory effort  Abdomen: s/nt/nd  Ext: Moves all 4 extremities, no c/c/e  Neuro: CN Intact, no focal deficits      Discharge meds     Medication List      START taking these medications    aspirin 325 MG Tabs  Take 1 tablet (325 mg total) by SKULL:             No evidence for fracture or osseous abnormality. OTHER:             None. CONCLUSION:  Multifocal decreased attenuation in inferior cerebellar hemispheres bilaterally is noted greater on the right than the left.   Findings coul extra-axial fluid collection identified. Acute infarcts in bilateral posterior inferior cerebellar artery territories are suggested. This is significantly greater on the right. This extends into the vermis.    No significant abnormal parenchymal gradien Small acute infarcts in the left cerebellum posterior inferior cerebellar artery territory. 2. There is no evidence of significant stenosis, aneurysmal dilatation or dissection involving the major arterial structures in the head and neck.   However, takeof Normal appearing pulmonic valve 8. Normal ascending and descending aorta in portions visualized 9. Normal interatrial septum w/o evidence of shunting Impression/Plan: Normal OBDULIO. No source of embolus found.      CARD ECHO 2D W DOPPLER/BUBBLES (CPT=93306) ventricle: The cavity size was normal. Systolic function was    normal. Systolic pressure was within the normal range. 3. Tricuspid valve: There was trivial regurgitation.  4. Atrial septum: Agitated saline contrast study showed no shunt, following    an in was normal-sized. Systemic veins: Inferior vena cava: The vessel was normal in size. The respirophasic diameter changes were in the normal range (greater than or equal to 50%).  Atrial septum:   Agitated saline contrast study showed no shunt, following an i Value        Reference  Mitral E-wave peak velocity             0.53  m/sec  ---------  Mitral A-wave peak velocity             0.77  m/sec  ---------  Mitral E/A ratio, peak                  0.7          --------- Legend: (L)  and  (H)  alyssa reno

## 2023-05-25 ENCOUNTER — TELEPHONE (OUTPATIENT)
Dept: NEUROLOGY | Facility: CLINIC | Age: 63
End: 2023-05-25

## 2023-05-30 DIAGNOSIS — R39.11 BENIGN PROSTATIC HYPERPLASIA WITH URINARY HESITANCY: ICD-10-CM

## 2023-05-30 DIAGNOSIS — N40.1 BENIGN PROSTATIC HYPERPLASIA WITH URINARY HESITANCY: ICD-10-CM

## 2023-05-31 RX ORDER — FINASTERIDE 5 MG/1
TABLET, FILM COATED ORAL
Qty: 90 TABLET | Refills: 0 | Status: SHIPPED | OUTPATIENT
Start: 2023-05-31

## 2023-06-07 DIAGNOSIS — R39.11 BENIGN PROSTATIC HYPERPLASIA WITH URINARY HESITANCY: ICD-10-CM

## 2023-06-07 DIAGNOSIS — I10 HYPERTENSION, UNSPECIFIED TYPE: ICD-10-CM

## 2023-06-07 DIAGNOSIS — E78.5 HYPERLIPIDEMIA LDL GOAL <70: ICD-10-CM

## 2023-06-07 DIAGNOSIS — N40.1 BENIGN PROSTATIC HYPERPLASIA WITH URINARY HESITANCY: ICD-10-CM

## 2023-06-07 DIAGNOSIS — Z86.73 HISTORY OF CVA (CEREBROVASCULAR ACCIDENT): ICD-10-CM

## 2023-06-08 RX ORDER — LISINOPRIL 10 MG/1
TABLET ORAL
Qty: 90 TABLET | Refills: 0 | OUTPATIENT
Start: 2023-06-08

## 2023-06-08 RX ORDER — TAMSULOSIN HYDROCHLORIDE 0.4 MG/1
CAPSULE ORAL
Qty: 180 CAPSULE | Refills: 0 | OUTPATIENT
Start: 2023-06-08

## 2023-06-08 RX ORDER — ATORVASTATIN CALCIUM 40 MG/1
TABLET, FILM COATED ORAL
Qty: 30 TABLET | Refills: 0 | OUTPATIENT
Start: 2023-06-08

## 2023-06-10 ENCOUNTER — LAB ENCOUNTER (OUTPATIENT)
Dept: LAB | Age: 63
End: 2023-06-10
Attending: INTERNAL MEDICINE
Payer: COMMERCIAL

## 2023-06-10 DIAGNOSIS — R73.03 PREDIABETES: ICD-10-CM

## 2023-06-10 DIAGNOSIS — N40.0 BENIGN PROSTATIC HYPERPLASIA, UNSPECIFIED WHETHER LOWER URINARY TRACT SYMPTOMS PRESENT: ICD-10-CM

## 2023-06-10 DIAGNOSIS — R79.89 ELEVATED LFTS: ICD-10-CM

## 2023-06-10 DIAGNOSIS — Z86.73 HISTORY OF CVA (CEREBROVASCULAR ACCIDENT): ICD-10-CM

## 2023-06-10 DIAGNOSIS — Z00.00 ANNUAL PHYSICAL EXAM: ICD-10-CM

## 2023-06-10 DIAGNOSIS — I10 HYPERTENSION, UNSPECIFIED TYPE: ICD-10-CM

## 2023-06-10 LAB
ALBUMIN SERPL-MCNC: 4 G/DL (ref 3.4–5)
ALBUMIN/GLOB SERPL: 1.1 {RATIO} (ref 1–2)
ALP LIVER SERPL-CCNC: 76 U/L
ALT SERPL-CCNC: 73 U/L
ANION GAP SERPL CALC-SCNC: 1 MMOL/L (ref 0–18)
AST SERPL-CCNC: 33 U/L (ref 15–37)
BASOPHILS # BLD AUTO: 0.07 X10(3) UL (ref 0–0.2)
BASOPHILS NFR BLD AUTO: 1 %
BILIRUB SERPL-MCNC: 1.4 MG/DL (ref 0.1–2)
BILIRUB UR QL STRIP.AUTO: NEGATIVE
BUN BLD-MCNC: 18 MG/DL (ref 7–18)
CALCIUM BLD-MCNC: 8.7 MG/DL (ref 8.5–10.1)
CHLORIDE SERPL-SCNC: 111 MMOL/L (ref 98–112)
CHOLEST SERPL-MCNC: 170 MG/DL (ref ?–200)
CLARITY UR REFRACT.AUTO: CLEAR
CO2 SERPL-SCNC: 24 MMOL/L (ref 21–32)
COLOR UR AUTO: YELLOW
COMPLEXED PSA SERPL-MCNC: 1.46 NG/ML (ref ?–4)
CREAT BLD-MCNC: 1 MG/DL
EOSINOPHIL # BLD AUTO: 0.28 X10(3) UL (ref 0–0.7)
EOSINOPHIL NFR BLD AUTO: 3.9 %
ERYTHROCYTE [DISTWIDTH] IN BLOOD BY AUTOMATED COUNT: 13.2 %
EST. AVERAGE GLUCOSE BLD GHB EST-MCNC: 114 MG/DL (ref 68–126)
FASTING PATIENT LIPID ANSWER: YES
FASTING STATUS PATIENT QL REPORTED: YES
GFR SERPLBLD BASED ON 1.73 SQ M-ARVRAT: 84 ML/MIN/1.73M2 (ref 60–?)
GLOBULIN PLAS-MCNC: 3.8 G/DL (ref 2.8–4.4)
GLUCOSE BLD-MCNC: 98 MG/DL (ref 70–99)
GLUCOSE UR STRIP.AUTO-MCNC: NEGATIVE MG/DL
HBA1C MFR BLD: 5.6 % (ref ?–5.7)
HCT VFR BLD AUTO: 45.9 %
HDLC SERPL-MCNC: 59 MG/DL (ref 40–59)
HGB BLD-MCNC: 15.2 G/DL
IMM GRANULOCYTES # BLD AUTO: 0.03 X10(3) UL (ref 0–1)
IMM GRANULOCYTES NFR BLD: 0.4 %
KETONES UR STRIP.AUTO-MCNC: NEGATIVE MG/DL
LDLC SERPL CALC-MCNC: 91 MG/DL (ref ?–100)
LEUKOCYTE ESTERASE UR QL STRIP.AUTO: NEGATIVE
LYMPHOCYTES # BLD AUTO: 1.91 X10(3) UL (ref 1–4)
LYMPHOCYTES NFR BLD AUTO: 26.6 %
MCH RBC QN AUTO: 29.6 PG (ref 26–34)
MCHC RBC AUTO-ENTMCNC: 33.1 G/DL (ref 31–37)
MCV RBC AUTO: 89.5 FL
MONOCYTES # BLD AUTO: 0.48 X10(3) UL (ref 0.1–1)
MONOCYTES NFR BLD AUTO: 6.7 %
NEUTROPHILS # BLD AUTO: 4.4 X10 (3) UL (ref 1.5–7.7)
NEUTROPHILS # BLD AUTO: 4.4 X10(3) UL (ref 1.5–7.7)
NEUTROPHILS NFR BLD AUTO: 61.4 %
NITRITE UR QL STRIP.AUTO: NEGATIVE
NONHDLC SERPL-MCNC: 111 MG/DL (ref ?–130)
OSMOLALITY SERPL CALC.SUM OF ELEC: 284 MOSM/KG (ref 275–295)
PH UR STRIP.AUTO: 5 [PH] (ref 5–8)
PLATELET # BLD AUTO: 240 10(3)UL (ref 150–450)
POTASSIUM SERPL-SCNC: 4.2 MMOL/L (ref 3.5–5.1)
PROT SERPL-MCNC: 7.8 G/DL (ref 6.4–8.2)
PROT UR STRIP.AUTO-MCNC: NEGATIVE MG/DL
RBC # BLD AUTO: 5.13 X10(6)UL
RBC UR QL AUTO: NEGATIVE
SODIUM SERPL-SCNC: 136 MMOL/L (ref 136–145)
SP GR UR STRIP.AUTO: 1.02 (ref 1–1.03)
TRIGL SERPL-MCNC: 111 MG/DL (ref 30–149)
TSI SER-ACNC: 0.62 MIU/ML (ref 0.36–3.74)
UROBILINOGEN UR STRIP.AUTO-MCNC: <2 MG/DL
VIT D+METAB SERPL-MCNC: 16.7 NG/ML (ref 30–100)
VLDLC SERPL CALC-MCNC: 18 MG/DL (ref 0–30)
WBC # BLD AUTO: 7.2 X10(3) UL (ref 4–11)

## 2023-06-10 PROCEDURE — 83036 HEMOGLOBIN GLYCOSYLATED A1C: CPT

## 2023-06-10 PROCEDURE — 84443 ASSAY THYROID STIM HORMONE: CPT

## 2023-06-10 PROCEDURE — 82306 VITAMIN D 25 HYDROXY: CPT

## 2023-06-10 PROCEDURE — 80061 LIPID PANEL: CPT

## 2023-06-10 PROCEDURE — 36415 COLL VENOUS BLD VENIPUNCTURE: CPT

## 2023-06-10 PROCEDURE — 80053 COMPREHEN METABOLIC PANEL: CPT

## 2023-06-10 PROCEDURE — 85025 COMPLETE CBC W/AUTO DIFF WBC: CPT

## 2023-06-10 PROCEDURE — 81003 URINALYSIS AUTO W/O SCOPE: CPT

## 2023-06-12 DIAGNOSIS — R79.89 ELEVATED LFTS: Primary | ICD-10-CM

## 2023-06-12 RX ORDER — ERGOCALCIFEROL 1.25 MG/1
50000 CAPSULE ORAL WEEKLY
Qty: 12 CAPSULE | Refills: 0 | Status: SHIPPED | OUTPATIENT
Start: 2023-06-12 | End: 2023-07-03

## 2023-06-13 DIAGNOSIS — N40.1 BENIGN PROSTATIC HYPERPLASIA WITH URINARY HESITANCY: ICD-10-CM

## 2023-06-13 DIAGNOSIS — E78.5 HYPERLIPIDEMIA LDL GOAL <70: ICD-10-CM

## 2023-06-13 DIAGNOSIS — I10 HYPERTENSION, UNSPECIFIED TYPE: ICD-10-CM

## 2023-06-13 DIAGNOSIS — R39.11 BENIGN PROSTATIC HYPERPLASIA WITH URINARY HESITANCY: ICD-10-CM

## 2023-06-13 DIAGNOSIS — Z86.73 HISTORY OF CVA (CEREBROVASCULAR ACCIDENT): ICD-10-CM

## 2023-06-13 RX ORDER — TAMSULOSIN HYDROCHLORIDE 0.4 MG/1
0.8 CAPSULE ORAL NIGHTLY
Qty: 180 CAPSULE | Refills: 0 | Status: CANCELLED | OUTPATIENT
Start: 2023-06-13

## 2023-06-13 RX ORDER — LISINOPRIL 10 MG/1
10 TABLET ORAL DAILY
Qty: 90 TABLET | Refills: 2 | Status: CANCELLED | OUTPATIENT
Start: 2023-06-13

## 2023-06-13 RX ORDER — ATORVASTATIN CALCIUM 40 MG/1
40 TABLET, FILM COATED ORAL NIGHTLY
Qty: 30 TABLET | Refills: 0 | Status: CANCELLED | OUTPATIENT
Start: 2023-06-13

## 2023-07-03 ENCOUNTER — OFFICE VISIT (OUTPATIENT)
Dept: INTERNAL MEDICINE CLINIC | Facility: CLINIC | Age: 63
End: 2023-07-03
Payer: COMMERCIAL

## 2023-07-03 VITALS
DIASTOLIC BLOOD PRESSURE: 60 MMHG | RESPIRATION RATE: 15 BRPM | BODY MASS INDEX: 28 KG/M2 | TEMPERATURE: 98 F | OXYGEN SATURATION: 98 % | SYSTOLIC BLOOD PRESSURE: 120 MMHG | HEART RATE: 59 BPM | WEIGHT: 186 LBS

## 2023-07-03 DIAGNOSIS — E55.9 VITAMIN D DEFICIENCY: ICD-10-CM

## 2023-07-03 DIAGNOSIS — E78.5 HYPERLIPIDEMIA LDL GOAL <70: ICD-10-CM

## 2023-07-03 DIAGNOSIS — R22.32 NODULE OF FINGER OF LEFT HAND: ICD-10-CM

## 2023-07-03 DIAGNOSIS — N40.1 BENIGN PROSTATIC HYPERPLASIA WITH URINARY HESITANCY: ICD-10-CM

## 2023-07-03 DIAGNOSIS — Z23 IMMUNIZATION DUE: ICD-10-CM

## 2023-07-03 DIAGNOSIS — R79.89 ELEVATED LFTS: ICD-10-CM

## 2023-07-03 DIAGNOSIS — Z86.73 HISTORY OF CVA (CEREBROVASCULAR ACCIDENT): ICD-10-CM

## 2023-07-03 DIAGNOSIS — R39.11 BENIGN PROSTATIC HYPERPLASIA WITH URINARY HESITANCY: ICD-10-CM

## 2023-07-03 DIAGNOSIS — I10 HYPERTENSION, UNSPECIFIED TYPE: Primary | ICD-10-CM

## 2023-07-03 PROBLEM — I63.9 CEREBELLAR STROKE (HCC): Status: RESOLVED | Noted: 2021-10-26 | Resolved: 2023-07-03

## 2023-07-03 PROCEDURE — 90471 IMMUNIZATION ADMIN: CPT | Performed by: INTERNAL MEDICINE

## 2023-07-03 PROCEDURE — 99214 OFFICE O/P EST MOD 30 MIN: CPT | Performed by: INTERNAL MEDICINE

## 2023-07-03 PROCEDURE — 90750 HZV VACC RECOMBINANT IM: CPT | Performed by: INTERNAL MEDICINE

## 2023-07-03 PROCEDURE — 3074F SYST BP LT 130 MM HG: CPT | Performed by: INTERNAL MEDICINE

## 2023-07-03 PROCEDURE — 3078F DIAST BP <80 MM HG: CPT | Performed by: INTERNAL MEDICINE

## 2023-07-03 RX ORDER — LISINOPRIL 10 MG/1
10 TABLET ORAL DAILY
Qty: 90 TABLET | Refills: 3 | Status: SHIPPED | OUTPATIENT
Start: 2023-07-03

## 2023-07-03 RX ORDER — ERGOCALCIFEROL 1.25 MG/1
50000 CAPSULE ORAL WEEKLY
Qty: 12 CAPSULE | Refills: 0 | Status: SHIPPED | OUTPATIENT
Start: 2023-07-03 | End: 2023-09-19

## 2023-07-03 RX ORDER — ATORVASTATIN CALCIUM 40 MG/1
40 TABLET, FILM COATED ORAL NIGHTLY
Qty: 90 TABLET | Refills: 3 | Status: SHIPPED | OUTPATIENT
Start: 2023-07-03

## 2023-07-03 RX ORDER — TAMSULOSIN HYDROCHLORIDE 0.4 MG/1
0.8 CAPSULE ORAL NIGHTLY
Qty: 180 CAPSULE | Refills: 3 | Status: SHIPPED | OUTPATIENT
Start: 2023-07-03

## 2023-08-14 ENCOUNTER — HOSPITAL ENCOUNTER (OUTPATIENT)
Dept: GENERAL RADIOLOGY | Age: 63
Discharge: HOME | End: 2023-08-14
Attending: INTERNAL MEDICINE
Payer: COMMERCIAL

## 2023-08-14 ENCOUNTER — LAB ENCOUNTER (OUTPATIENT)
Dept: LAB | Age: 63
End: 2023-08-14
Attending: INTERNAL MEDICINE
Payer: COMMERCIAL

## 2023-08-14 DIAGNOSIS — R06.09 DOE (DYSPNEA ON EXERTION): ICD-10-CM

## 2023-08-14 DIAGNOSIS — R22.32 NODULE OF FINGER OF LEFT HAND: ICD-10-CM

## 2023-08-14 DIAGNOSIS — E55.9 VITAMIN D DEFICIENCY: ICD-10-CM

## 2023-08-14 LAB — VIT D+METAB SERPL-MCNC: 40.4 NG/ML (ref 30–100)

## 2023-08-14 PROCEDURE — 73130 X-RAY EXAM OF HAND: CPT | Performed by: INTERNAL MEDICINE

## 2023-08-14 PROCEDURE — 71046 X-RAY EXAM CHEST 2 VIEWS: CPT | Performed by: INTERNAL MEDICINE

## 2023-08-14 PROCEDURE — 82306 VITAMIN D 25 HYDROXY: CPT | Performed by: INTERNAL MEDICINE

## 2023-09-07 DIAGNOSIS — N40.1 BENIGN PROSTATIC HYPERPLASIA WITH URINARY HESITANCY: ICD-10-CM

## 2023-09-07 DIAGNOSIS — R39.11 BENIGN PROSTATIC HYPERPLASIA WITH URINARY HESITANCY: ICD-10-CM

## 2023-09-07 NOTE — TELEPHONE ENCOUNTER
Dr. Ari Staples pt.     Finasteride 5 mg  Filled 5-31-23  Qty 90  0 refills  Future Appointments   Date Time Provider Alexandra Leandra   10/3/2023  3:00 PM Ally Nunez MD [facility] [place]   LOV 7-3-23 DV  RTC none

## 2023-09-08 RX ORDER — FINASTERIDE 5 MG/1
5 TABLET, FILM COATED ORAL DAILY
Qty: 90 TABLET | Refills: 0 | Status: SHIPPED | OUTPATIENT
Start: 2023-09-08

## 2023-09-14 DIAGNOSIS — R06.09 DOE (DYSPNEA ON EXERTION): Primary | ICD-10-CM

## 2023-10-03 ENCOUNTER — OFFICE VISIT (OUTPATIENT)
Dept: INTERNAL MEDICINE CLINIC | Facility: CLINIC | Age: 63
End: 2023-10-03
Payer: COMMERCIAL

## 2023-10-03 VITALS
TEMPERATURE: 99 F | DIASTOLIC BLOOD PRESSURE: 60 MMHG | HEART RATE: 77 BPM | WEIGHT: 186 LBS | SYSTOLIC BLOOD PRESSURE: 100 MMHG | RESPIRATION RATE: 15 BRPM | BODY MASS INDEX: 28 KG/M2 | OXYGEN SATURATION: 97 %

## 2023-10-03 DIAGNOSIS — Z86.79 HISTORY OF ANGINA: ICD-10-CM

## 2023-10-03 DIAGNOSIS — I10 HYPERTENSION, UNSPECIFIED TYPE: Primary | ICD-10-CM

## 2023-10-03 DIAGNOSIS — Z23 IMMUNIZATION DUE: ICD-10-CM

## 2023-10-03 DIAGNOSIS — M79.645 PAIN OF FINGER OF LEFT HAND: ICD-10-CM

## 2023-10-03 DIAGNOSIS — R79.89 ELEVATED LFTS: ICD-10-CM

## 2023-10-03 DIAGNOSIS — Z86.73 HISTORY OF CVA (CEREBROVASCULAR ACCIDENT): ICD-10-CM

## 2023-10-03 PROBLEM — E55.9 VITAMIN D DEFICIENCY: Status: RESOLVED | Noted: 2023-07-03 | Resolved: 2023-10-03

## 2023-10-03 PROCEDURE — 90471 IMMUNIZATION ADMIN: CPT | Performed by: INTERNAL MEDICINE

## 2023-10-03 PROCEDURE — 90662 IIV NO PRSV INCREASED AG IM: CPT | Performed by: INTERNAL MEDICINE

## 2023-10-03 PROCEDURE — 3074F SYST BP LT 130 MM HG: CPT | Performed by: INTERNAL MEDICINE

## 2023-10-03 PROCEDURE — 99214 OFFICE O/P EST MOD 30 MIN: CPT | Performed by: INTERNAL MEDICINE

## 2023-10-03 PROCEDURE — 3078F DIAST BP <80 MM HG: CPT | Performed by: INTERNAL MEDICINE

## 2023-11-17 NOTE — IMAGING NOTE
Pt scheduled for CT gated coronary. Used [place] interpretor services. Discussed arrival time 18. Instructed to hold all caffeine, eat meals, hydrate well with water, and take all prescribed meds. Pt verbalized understanding of all instructions.

## 2023-11-21 ENCOUNTER — HOSPITAL ENCOUNTER (OUTPATIENT)
Dept: CT IMAGING | Facility: HOSPITAL | Age: 63
Discharge: HOME | End: 2023-11-21
Attending: INTERNAL MEDICINE
Payer: COMMERCIAL

## 2023-11-21 VITALS
HEART RATE: 59 BPM | DIASTOLIC BLOOD PRESSURE: 49 MMHG | OXYGEN SATURATION: 97 % | SYSTOLIC BLOOD PRESSURE: 96 MMHG | RESPIRATION RATE: 13 BRPM

## 2023-11-21 DIAGNOSIS — R06.09 DOE (DYSPNEA ON EXERTION): ICD-10-CM

## 2023-11-21 LAB
CREAT BLD-MCNC: 0.8 MG/DL
EGFRCR SERPLBLD CKD-EPI 2021: 98 ML/MIN/1.73M2 (ref 60–?)

## 2023-11-21 PROCEDURE — 75574 CT ANGIO HRT W/3D IMAGE: CPT | Performed by: INTERNAL MEDICINE

## 2023-11-21 PROCEDURE — 82565 ASSAY OF CREATININE: CPT

## 2023-11-21 RX ORDER — NITROGLYCERIN 0.4 MG/1
TABLET SUBLINGUAL
Status: COMPLETED
Start: 2023-11-21 | End: 2023-11-21

## 2023-11-21 RX ORDER — NITROGLYCERIN 0.4 MG/1
0.4 TABLET SUBLINGUAL ONCE
Status: COMPLETED | OUTPATIENT
Start: 2023-11-21 | End: 2023-11-21

## 2023-11-21 RX ADMIN — NITROGLYCERIN 0.4 MG: 0.4 TABLET SUBLINGUAL at 13:58:00

## 2023-11-21 NOTE — IMAGING NOTE
Pt arrives to room CT 4 at 13:52 PM. Working with [address] [place] [address]. IV established by Madeline Metzger RN to left [facility] with 20 gauge angiocath x 2 attempt. Pt denies long acting nitrates. Pt positioned on CT table comfortably. Procedure explained and questions answered. O2 applied via NC at 2 LPM. VSS as noted in flowsheet. GFR =98   imaging started at 14:00 PM    0.9NS flush followed by Omnipaque contrast at 14:04 PM    omnipaque contrast = 70 mL  0.9NS = 66 mL  Average HR =  57    Pt tolerated procedure without complication. Denies s/sx of contrast reaction. IV dc'd intact at 14:09 PM. Gauze and coban applied to site. Pt A/O x 4 and denies pain. Ambulatory and in stable condition.  Dc'd to home at 14:14 PM.     services used for translation: Deb Oliva [id number]

## 2023-12-18 DIAGNOSIS — R39.11 BENIGN PROSTATIC HYPERPLASIA WITH URINARY HESITANCY: ICD-10-CM

## 2023-12-18 DIAGNOSIS — N40.1 BENIGN PROSTATIC HYPERPLASIA WITH URINARY HESITANCY: ICD-10-CM

## 2023-12-19 RX ORDER — FINASTERIDE 5 MG/1
5 TABLET, FILM COATED ORAL DAILY
Qty: 90 TABLET | Refills: 1 | Status: SHIPPED | OUTPATIENT
Start: 2023-12-19

## 2023-12-19 NOTE — TELEPHONE ENCOUNTER
LOV: 10/3/2023 with Dr. Teresita Lopez  RTC: no follow-up on file  Last Relevant Labs: 6/10/2023 AND 8/14/2023  Filled: 9/8/2023    #90 with 0 refills    No future appointments.

## 2024-06-05 DIAGNOSIS — R39.11 BENIGN PROSTATIC HYPERPLASIA WITH URINARY HESITANCY: ICD-10-CM

## 2024-06-05 DIAGNOSIS — N40.1 BENIGN PROSTATIC HYPERPLASIA WITH URINARY HESITANCY: ICD-10-CM

## 2024-06-06 RX ORDER — FINASTERIDE 5 MG/1
5 TABLET, FILM COATED ORAL DAILY
Qty: 90 TABLET | Refills: 3 | Status: SHIPPED | OUTPATIENT
Start: 2024-06-06

## 2024-06-06 NOTE — TELEPHONE ENCOUNTER
Finasteride 5 MG Oral Tablet          Will file in chart as: FINASTERIDE 5 MG Oral Tab    Sig: Take 1 tablet by mouth once daily    Disp: 90 tablet    Refills: 0    Start: 6/5/2024    Class: Normal    Non-formulary For: Benign prostatic hyperplasia with urinary hesitancy    Last ordered: 5 months ago (12/19/2023) by Katharine Grant MD    Last refill: 5/12/2024    Rx #: [id number]    Genitourinary Medications Vmlvtk4406/05/2024 06:26 PM   Protocol Details Patient does not have pulmonary hypertension on problem list    In person appointment or virtual visit in the past 12 mos or appointment in next 3 mos      LOV 10/3/23  RTC none  Filled 12/19/23 90 tabs 1 refill  No future appointments.

## 2024-09-30 DIAGNOSIS — R39.11 BENIGN PROSTATIC HYPERPLASIA WITH URINARY HESITANCY: ICD-10-CM

## 2024-09-30 DIAGNOSIS — N40.1 BENIGN PROSTATIC HYPERPLASIA WITH URINARY HESITANCY: ICD-10-CM

## 2024-09-30 RX ORDER — TAMSULOSIN HYDROCHLORIDE 0.4 MG/1
0.8 CAPSULE ORAL NIGHTLY
Qty: 60 CAPSULE | Refills: 0 | Status: SHIPPED | OUTPATIENT
Start: 2024-09-30

## 2024-09-30 NOTE — TELEPHONE ENCOUNTER
Requesting    Name from pharmacy: Tamsulosin HCl 0.4 MG Oral Capsule         Will file in chart as: TAMSULOSIN 0.4 MG Oral Cap    Sig: TAKE 2 CAPSULES BY MOUTH NIGHTLY AT BEDTIME    Disp: 60 capsule    Refills: 0    Start: 9/30/2024    Class: Normal    Non-formulary For: Benign prostatic hyperplasia with urinary hesitancy    Last ordered: 1 year ago (7/3/2023) by Katharine Grant MD    Last refill: 9/2/2024    Rx #: [id number]    Genitourinary Medications Zxgrew8209/30/2024 05:51 AM   Protocol Details Patient does not have pulmonary hypertension on problem list    In person appointment or virtual visit in the past 12 mos or appointment in next 3 mos      To be filled at: [address] [phone], [phone]     LOV: 10/03/23 w/ DV  RTC: No Follow Up on File   Last Relevant Labs: 06/10/23  No future appointments.

## 2024-10-06 DIAGNOSIS — Z86.73 HISTORY OF CVA (CEREBROVASCULAR ACCIDENT): ICD-10-CM

## 2024-10-06 DIAGNOSIS — E78.5 HYPERLIPIDEMIA LDL GOAL <70: ICD-10-CM

## 2024-10-06 DIAGNOSIS — I10 HYPERTENSION, UNSPECIFIED TYPE: ICD-10-CM

## 2024-10-07 RX ORDER — ATORVASTATIN CALCIUM 40 MG/1
40 TABLET, FILM COATED ORAL NIGHTLY
Qty: 30 TABLET | Refills: 0 | Status: SHIPPED | OUTPATIENT
Start: 2024-10-07

## 2024-10-07 RX ORDER — LISINOPRIL 10 MG/1
10 TABLET ORAL DAILY
Qty: 30 TABLET | Refills: 0 | Status: SHIPPED | OUTPATIENT
Start: 2024-10-07

## 2024-10-07 NOTE — TELEPHONE ENCOUNTER
Courtesy refill. Pt needs to follow-up. Please let him know that if there is no follow-up, can't provide further refills.

## 2024-10-07 NOTE — TELEPHONE ENCOUNTER
Requesting    Name from pharmacy: Lisinopril 10 MG Oral Tablet         Will file in chart as: LISINOPRIL 10 MG Oral Tab    Sig: Take 1 tablet by mouth once daily    Disp: 30 tablet    Refills: 0    Start: 10/6/2024    Class: Normal    Non-formulary For: Hypertension, unspecified type; History of CVA (cerebrovascular accident)    Last ordered: 1 year ago (7/3/2023) by Katharine Grant MD    Last refill: 9/2/2024    Rx #: [id number]    Hypertension Medications Protocol Omyrft36/06/2024 12:17 PM   Protocol Details CMP or BMP in past 12 months    In person appointment or virtual visit in the past 12 mos or appointment in next 3 mos    Last BP reading less than 140/90    EGFRCR or GFRNAA > 50       Name from pharmacy: Atorvastatin Calcium 40 MG Oral Tablet         Will file in chart as: ATORVASTATIN 40 MG Oral Tab    Sig: Take 1 tablet by mouth nightly    Disp: 30 tablet    Refills: 0    Start: 10/6/2024    Class: Normal    For: History of CVA (cerebrovascular accident), Hyperlipidemia LDL goal <70    Last ordered: 1 year ago (7/3/2023) by Katharine Grant MD    Last refill: 9/2/2024    Rx #: [id number]    Cholesterol Medication Protocol Gwnwmx20/06/2024 12:17 PM   Protocol Details ALT < 80    ALT resulted within past year    Lipid panel within past 12 months    In person appointment or virtual visit in the past 12 mos or appointment in next 3 mos      To be filled at: [address] [phone], [phone]     LOV: 10/03/23 w/ TYSONF  RTC: No Follow Up on File   Last Relevant Labs: 06/10/23  No future appointments.

## 2024-10-31 DIAGNOSIS — R39.11 BENIGN PROSTATIC HYPERPLASIA WITH URINARY HESITANCY: ICD-10-CM

## 2024-10-31 DIAGNOSIS — N40.1 BENIGN PROSTATIC HYPERPLASIA WITH URINARY HESITANCY: ICD-10-CM

## 2024-10-31 RX ORDER — TAMSULOSIN HYDROCHLORIDE 0.4 MG/1
0.8 CAPSULE ORAL NIGHTLY
Qty: 60 CAPSULE | Refills: 0 | Status: SHIPPED | OUTPATIENT
Start: 2024-10-31

## 2024-10-31 NOTE — TELEPHONE ENCOUNTER
Requesting    Name from pharmacy: Tamsulosin HCl 0.4 MG Oral Capsule         Will file in chart as: TAMSULOSIN 0.4 MG Oral Cap    Sig: TAKE 2 CAPSULES BY MOUTH NIGHTLY AT BEDTIME    Disp: 60 capsule    Refills: 0    Start: 10/31/2024    Class: Normal    Non-formulary For: Benign prostatic hyperplasia with urinary hesitancy    Last ordered: 1 month ago (9/30/2024) by Katharine Grant MD    Last refill: 10/2/2024    Rx #: [id number]    Genitourinary Medications Xamzjd04/31/2024 05:51 AM   Protocol Details In person appointment or virtual visit in the past 12 mos or appointment in next 3 mos    Patient does not have pulmonary hypertension on problem list      To be filled at: [address] [phone], [phone]        LOV: 10/03/23 w/ DV  RTC: No Follow Up on File   Last Relevant Labs: 06/10/23  No future appointments.

## 2024-11-05 DIAGNOSIS — E78.5 HYPERLIPIDEMIA LDL GOAL <70: ICD-10-CM

## 2024-11-05 DIAGNOSIS — I10 HYPERTENSION, UNSPECIFIED TYPE: ICD-10-CM

## 2024-11-05 DIAGNOSIS — Z01.89 ROUTINE LAB DRAW: Primary | ICD-10-CM

## 2024-11-05 DIAGNOSIS — Z86.73 HISTORY OF CVA (CEREBROVASCULAR ACCIDENT): ICD-10-CM

## 2024-11-05 NOTE — TELEPHONE ENCOUNTER
Refill request. EnCoate #[id number]     Pt out of medication. (call pt if refill approved)    lisinopril 10 MG Oral Tab 30 tablet     atorvastatin 40 MG Oral Tab 30 tablet       Future Appointments   Date Time Provider Department Center   12/17/2024  3:40 PM Katharine Marroquin MD [facility] [place]

## 2024-11-06 NOTE — TELEPHONE ENCOUNTER
Requesting    lisinopril 10 MG Oral Tab         Sig: Take 1 tablet (10 mg total) by mouth daily.    Disp: 30 tablet    Refills: 0    Start: 11/5/2024    Class: Normal    For: Hypertension, unspecified type; History of CVA (cerebrovascular accident)    To pharmacy: Courtesy refill. Pt needs to follow-up    Last ordered: 1 month ago (10/7/2024) by Katharine Grant MD    Hypertension Medications Protocol Gnwzqj0011/05/2024 03:14 PM   Protocol Details CMP or BMP in past 12 months    Last BP reading less than 140/90    In person appointment or virtual visit in the past 12 mos or appointment in next 3 mos    EGFRCR or GFRNAA > 50       atorvastatin 40 MG Oral Tab         Sig: Take 1 tablet (40 mg total) by mouth nightly.    Disp: 30 tablet    Refills: 0    Start: 11/5/2024    Class: Normal    For: History of CVA (cerebrovascular accident), Hyperlipidemia LDL goal <70    Last ordered: 1 month ago (10/7/2024) by Katharine Grant MD    Cholesterol Medication Protocol Nbdbhj2011/05/2024 03:14 PM   Protocol Details ALT < 80    ALT resulted within past year    Lipid panel within past 12 months    In person appointment or virtual visit in the past 12 mos or appointment in next 3 mos      To be filled at: [facility] [address] [place] [address] [phone], [phone]     LOV: 10/03/23 w/ DVF  RTC: No Follow Up on File   Last Relevant Labs: 06/2023      Future Appointments   Date Time Provider Department Center   12/17/2024  3:40 PM Katharine Marroquin MD [facility] [place]

## 2024-11-07 RX ORDER — ATORVASTATIN CALCIUM 40 MG/1
40 TABLET, FILM COATED ORAL NIGHTLY
Qty: 30 TABLET | Refills: 0 | Status: SHIPPED | OUTPATIENT
Start: 2024-11-07

## 2024-11-07 RX ORDER — LISINOPRIL 10 MG/1
10 TABLET ORAL DAILY
Qty: 30 TABLET | Refills: 0 | Status: SHIPPED | OUTPATIENT
Start: 2024-11-07

## 2024-11-09 DIAGNOSIS — Z86.73 HISTORY OF CVA (CEREBROVASCULAR ACCIDENT): ICD-10-CM

## 2024-11-09 DIAGNOSIS — E78.5 HYPERLIPIDEMIA LDL GOAL <70: ICD-10-CM

## 2024-11-09 DIAGNOSIS — I10 HYPERTENSION, UNSPECIFIED TYPE: ICD-10-CM

## 2024-11-11 RX ORDER — LISINOPRIL 10 MG/1
10 TABLET ORAL DAILY
Qty: 30 TABLET | Refills: 0 | OUTPATIENT
Start: 2024-11-11

## 2024-11-11 RX ORDER — ATORVASTATIN CALCIUM 40 MG/1
40 TABLET, FILM COATED ORAL NIGHTLY
Qty: 30 TABLET | Refills: 0 | OUTPATIENT
Start: 2024-11-11

## 2024-12-05 DIAGNOSIS — N40.1 BENIGN PROSTATIC HYPERPLASIA WITH URINARY HESITANCY: ICD-10-CM

## 2024-12-05 DIAGNOSIS — R39.11 BENIGN PROSTATIC HYPERPLASIA WITH URINARY HESITANCY: ICD-10-CM

## 2024-12-05 RX ORDER — TAMSULOSIN HYDROCHLORIDE 0.4 MG/1
0.8 CAPSULE ORAL NIGHTLY
Qty: 60 CAPSULE | Refills: 0 | Status: SHIPPED | OUTPATIENT
Start: 2024-12-05 | End: 2024-12-17

## 2024-12-05 NOTE — TELEPHONE ENCOUNTER
Requesting    Name from pharmacy: Tamsulosin HCl 0.4 MG Oral Capsule         Will file in chart as: TAMSULOSIN 0.4 MG Oral Cap    Sig: TAKE 2 CAPSULES BY MOUTH NIGHTLY AT BEDTIME    Disp: 60 capsule    Refills: 0    Start: 12/5/2024    Class: Normal    Non-formulary For: Benign prostatic hyperplasia with urinary hesitancy    Last ordered: 1 month ago (10/31/2024) by Katharine Grant MD    Last refill: 11/3/2024    Rx #: [id number]    Genitourinary Medications Uupzon0212/05/2024 05:51 AM   Protocol Details Patient does not have pulmonary hypertension on problem list    In person appointment or virtual visit in the past 12 mos or appointment in next 3 mos      To be filled at: [address] [phone], [phone]     LOV: 10/03/23 w/ DV  RTC: No Follow Up on File   Last Relevant Labs: 06/10/23    Future Appointments   Date Time Provider Department Center   12/17/2024  3:40 PM Katharine Marroquin MD [facility] 8 [facility]

## 2024-12-10 ENCOUNTER — HOSPITAL ENCOUNTER (EMERGENCY)
Facility: HOSPITAL | Age: 64
Discharge: HOME | End: 2024-12-10
Attending: EMERGENCY MEDICINE
Payer: COMMERCIAL

## 2024-12-10 ENCOUNTER — APPOINTMENT (OUTPATIENT)
Dept: GENERAL RADIOLOGY | Facility: HOSPITAL | Age: 64
End: 2024-12-10
Payer: COMMERCIAL

## 2024-12-10 VITALS
TEMPERATURE: 98 F | WEIGHT: 187 LBS | HEART RATE: 70 BPM | HEIGHT: 69 IN | SYSTOLIC BLOOD PRESSURE: 173 MMHG | BODY MASS INDEX: 27.7 KG/M2 | DIASTOLIC BLOOD PRESSURE: 88 MMHG | RESPIRATION RATE: 16 BRPM | OXYGEN SATURATION: 98 %

## 2024-12-10 DIAGNOSIS — J11.1 INFLUENZA: Primary | ICD-10-CM

## 2024-12-10 LAB
ALBUMIN SERPL-MCNC: 4.3 G/DL (ref 3.2–4.8)
ALBUMIN/GLOB SERPL: 1 {RATIO} (ref 1–2)
ALP LIVER SERPL-CCNC: 76 U/L
ALT SERPL-CCNC: 114 U/L
ANION GAP SERPL CALC-SCNC: 11 MMOL/L (ref 0–18)
APTT PPP: 27.2 SECONDS (ref 23–36)
AST SERPL-CCNC: 65 U/L (ref ?–34)
BASOPHILS # BLD AUTO: 0.06 X10(3) UL (ref 0–0.2)
BASOPHILS NFR BLD AUTO: 0.8 %
BILIRUB SERPL-MCNC: 1.4 MG/DL (ref 0.2–1.1)
BUN BLD-MCNC: 26 MG/DL (ref 9–23)
CALCIUM BLD-MCNC: 9.2 MG/DL (ref 8.7–10.4)
CHLORIDE SERPL-SCNC: 106 MMOL/L (ref 98–112)
CO2 SERPL-SCNC: 22 MMOL/L (ref 21–32)
CREAT BLD-MCNC: 1.06 MG/DL
EGFRCR SERPLBLD CKD-EPI 2021: 77 ML/MIN/1.73M2 (ref 60–?)
EOSINOPHIL # BLD AUTO: 0.19 X10(3) UL (ref 0–0.7)
EOSINOPHIL NFR BLD AUTO: 2.4 %
ERYTHROCYTE [DISTWIDTH] IN BLOOD BY AUTOMATED COUNT: 13.1 %
FLUAV + FLUBV RNA SPEC NAA+PROBE: NEGATIVE
FLUAV + FLUBV RNA SPEC NAA+PROBE: POSITIVE
GLOBULIN PLAS-MCNC: 4.4 G/DL (ref 2–3.5)
GLUCOSE BLD-MCNC: 117 MG/DL (ref 70–99)
HCT VFR BLD AUTO: 42.6 %
HGB BLD-MCNC: 14.7 G/DL
IMM GRANULOCYTES # BLD AUTO: 0.04 X10(3) UL (ref 0–1)
IMM GRANULOCYTES NFR BLD: 0.5 %
INR BLD: 1.02 (ref 0.8–1.2)
LYMPHOCYTES # BLD AUTO: 1.86 X10(3) UL (ref 1–4)
LYMPHOCYTES NFR BLD AUTO: 23.5 %
MCH RBC QN AUTO: 30.5 PG (ref 26–34)
MCHC RBC AUTO-ENTMCNC: 34.5 G/DL (ref 31–37)
MCV RBC AUTO: 88.4 FL
MONOCYTES # BLD AUTO: 0.61 X10(3) UL (ref 0.1–1)
MONOCYTES NFR BLD AUTO: 7.7 %
NEUTROPHILS # BLD AUTO: 5.17 X10 (3) UL (ref 1.5–7.7)
NEUTROPHILS # BLD AUTO: 5.17 X10(3) UL (ref 1.5–7.7)
NEUTROPHILS NFR BLD AUTO: 65.1 %
NT-PROBNP SERPL-MCNC: <35 PG/ML (ref ?–125)
OSMOLALITY SERPL CALC.SUM OF ELEC: 294 MOSM/KG (ref 275–295)
PLATELET # BLD AUTO: 224 10(3)UL (ref 150–450)
POTASSIUM SERPL-SCNC: 4 MMOL/L (ref 3.5–5.1)
PROT SERPL-MCNC: 8.7 G/DL (ref 5.7–8.2)
PROTHROMBIN TIME: 13.5 SECONDS (ref 11.6–14.8)
RBC # BLD AUTO: 4.82 X10(6)UL
RSV RNA SPEC NAA+PROBE: NEGATIVE
SARS-COV-2 RNA RESP QL NAA+PROBE: NOT DETECTED
SODIUM SERPL-SCNC: 139 MMOL/L (ref 136–145)
TROPONIN I SERPL HS-MCNC: <3 NG/L
WBC # BLD AUTO: 7.9 X10(3) UL (ref 4–11)

## 2024-12-10 PROCEDURE — 85730 THROMBOPLASTIN TIME PARTIAL: CPT

## 2024-12-10 PROCEDURE — 83880 ASSAY OF NATRIURETIC PEPTIDE: CPT | Performed by: EMERGENCY MEDICINE

## 2024-12-10 PROCEDURE — 85025 COMPLETE CBC W/AUTO DIFF WBC: CPT | Performed by: EMERGENCY MEDICINE

## 2024-12-10 PROCEDURE — 93005 ELECTROCARDIOGRAM TRACING: CPT

## 2024-12-10 PROCEDURE — 84484 ASSAY OF TROPONIN QUANT: CPT | Performed by: EMERGENCY MEDICINE

## 2024-12-10 PROCEDURE — 93010 ELECTROCARDIOGRAM REPORT: CPT

## 2024-12-10 PROCEDURE — 85610 PROTHROMBIN TIME: CPT | Performed by: EMERGENCY MEDICINE

## 2024-12-10 PROCEDURE — 0241U SARS-COV-2/FLU A AND B/RSV BY PCR (GENEXPERT): CPT | Performed by: EMERGENCY MEDICINE

## 2024-12-10 PROCEDURE — 99285 EMERGENCY DEPT VISIT HI MDM: CPT

## 2024-12-10 PROCEDURE — 85730 THROMBOPLASTIN TIME PARTIAL: CPT | Performed by: EMERGENCY MEDICINE

## 2024-12-10 PROCEDURE — 85025 COMPLETE CBC W/AUTO DIFF WBC: CPT

## 2024-12-10 PROCEDURE — 36415 COLL VENOUS BLD VENIPUNCTURE: CPT

## 2024-12-10 PROCEDURE — 0241U SARS-COV-2/FLU A AND B/RSV BY PCR (GENEXPERT): CPT

## 2024-12-10 PROCEDURE — 85610 PROTHROMBIN TIME: CPT

## 2024-12-10 PROCEDURE — 80053 COMPREHEN METABOLIC PANEL: CPT

## 2024-12-10 PROCEDURE — 80053 COMPREHEN METABOLIC PANEL: CPT | Performed by: EMERGENCY MEDICINE

## 2024-12-10 PROCEDURE — 84484 ASSAY OF TROPONIN QUANT: CPT

## 2024-12-10 PROCEDURE — 71045 X-RAY EXAM CHEST 1 VIEW: CPT

## 2024-12-10 PROCEDURE — 83880 ASSAY OF NATRIURETIC PEPTIDE: CPT

## 2024-12-10 PROCEDURE — 99284 EMERGENCY DEPT VISIT MOD MDM: CPT

## 2024-12-10 RX ORDER — BENZONATATE 100 MG/1
100 CAPSULE ORAL 3 TIMES DAILY PRN
Qty: 30 CAPSULE | Refills: 0 | Status: SHIPPED | OUTPATIENT
Start: 2024-12-10 | End: 2025-01-09

## 2024-12-10 RX ORDER — OSELTAMIVIR PHOSPHATE 75 MG/1
75 CAPSULE ORAL 2 TIMES DAILY
Qty: 10 CAPSULE | Refills: 0 | Status: SHIPPED | OUTPATIENT
Start: 2024-12-10 | End: 2024-12-15

## 2024-12-10 NOTE — ED INITIAL ASSESSMENT (HPI)
PT states that he has been experiencing difficulty breathing for a week. PT adds that he had fever, chest discomfort and pressure and productive cough.

## 2024-12-11 DIAGNOSIS — I10 HYPERTENSION, UNSPECIFIED TYPE: ICD-10-CM

## 2024-12-11 DIAGNOSIS — E78.5 HYPERLIPIDEMIA LDL GOAL <70: ICD-10-CM

## 2024-12-11 DIAGNOSIS — Z86.73 HISTORY OF CVA (CEREBROVASCULAR ACCIDENT): ICD-10-CM

## 2024-12-11 NOTE — ED PROVIDER NOTES
Patient Seen in: [facility] Emergency Department      History     Chief Complaint   Patient presents with    Shortness Of Breath     Stated Complaint: SOB x1 week 97% RA    Subjective:   HPI      64-year-old with a history of stroke, hypertension, BPH presents for evaluation of upper respiratory symptoms.  Patient started feeling sick last week, Friday 12/6. He has a cough, fever, nasal congestion. Chest feels tight. Feels short of breath from his nose being stuffy and when walking around. No sick contacts.   Hasn't gotten his flu shot yet this year.  PCP note reviewed from October 2023.  This notes controlled hypertension.  On statin for history of CVA.  This also notes elevated LFTs.  Objective:     Past Medical History:    Arterial ischemic stroke, vertebrobasilar, cerebellar, acute (HCC)    Cerebellar stroke (HCC)    10/2021    Cerebellar stroke (HCC)    Essential hypertension    History of BPH    Vitamin D deficiency              History reviewed. No pertinent surgical history.             Social History     Socioeconomic History    Marital status: Single   Tobacco Use    Smoking status: Never    Smokeless tobacco: Never   Vaping Use    Vaping status: Never Used   Substance and Sexual Activity    Alcohol use: Never    Drug use: Never   Other Topics Concern    Caffeine Concern No    Exercise No    Seat Belt No    Special Diet No    Stress Concern No    Weight Concern No                  Physical Exam     ED Triage Vitals [12/10/24 1512]   BP (!) 173/88   Pulse 70   Resp 16   Temp 98 °F (36.7 °C)   Temp src Oral   SpO2 98 %   O2 Device None (Room air)       Current Vitals:   Vital Signs  BP: (!) 173/88  Pulse: 70  Resp: 16  Temp: 98 °F (36.7 °C)  Temp src: Oral    Oxygen Therapy  SpO2: 98 %  O2 Device: None (Room air)        Physical Exam  General: Patient is awake, alert in no acute distress.   HEENT:  Sclera are not icteric.  Conjunctivae within normal limits.    Cardiovascular: Regular rate and rhythm,  normal S1-S2.  Respiratory: Lungs are clear to auscultation bilaterally.   Extremities: No pitting edema.      ED Course     Labs Reviewed   COMP METABOLIC PANEL (14) - Abnormal; Notable for the following components:       Result Value    Glucose 117 (*)     BUN 26 (*)     AST 65 (*)      (*)     Bilirubin, Total 1.4 (*)     Total Protein 8.7 (*)     Globulin  4.4 (*)     All other components within normal limits   SARS-COV-2/FLU A AND B/RSV BY PCR (GENEXPERT) - Abnormal; Notable for the following components:    Influenza A by PCR Positive (*)     All other components within normal limits    Narrative:     This test is intended for the qualitative detection and differentiation of SARS-CoV-2, influenza A, influenza B, and respiratory syncytial virus (RSV) viral RNA in nasopharyngeal or nares swabs from individuals suspected of respiratory viral infection consistent with COVID-19 by their healthcare provider. Signs and symptoms of respiratory viral infection due to SARS-CoV-2, influenza, and RSV can be similar.    Test performed using the Xpert Xpress SARS-CoV-2/FLU/RSV (real time RT-PCR)  assay on the [employer] instrument, [employer], [place] [address].   This test is being used under the Food and Drug Administration's Emergency Use Authorization.    The authorized Fact Sheet for Healthcare Providers for this assay is available upon request from the laboratory.   TROPONIN I HIGH SENSITIVITY - Normal   PRO BETA NATRIURETIC PEPTIDE - Normal   PROTHROMBIN TIME (PT) - Normal   PTT, ACTIVATED - Normal   CBC WITH DIFFERENTIAL WITH PLATELET     EKG    Rate, intervals and axes as noted on EKG Report.  Rate: 66  Rhythm: Sinus Rhythm  Reading: No ST elevation or depression.  No dysrhythmia.  Normal intervals.  QTc normal at 406.         Chest x-ray: I personally reviewed the radiographs and my individual interpretation shows  no consolidation.  I also reviewed the official report which showed mild perihilar interstitial  and bronchial wall thickening.              MDM       Previous records reviewed as noted in HPI    Differential includes, but is not limited to, pneumonia, influenza, pneumothorax, STEMI    Review of any laboratory testing: CBC normal.  CMP with mildly elevated liver enzymes which patient has had in the past.  Troponin and proBNP normal.  COVID/RSV negative.  Influenza A positive.  Coags normal as expected.     Review of any radiographic studies: Chest x-ray without pneumonia    Shared decision making with the patient.  Workup reassuring other than influenza A positive.  Return precautions given.    The administration of these medications were addressed : Tamiflu offered                             Medical Decision Making      Disposition and Plan     Clinical Impression:  1. Influenza         Disposition:  Discharge  12/10/2024  6:23 pm    Follow-up:  [facility] Emergency Department  [address] [facility] [address]  [phone]  Follow up  As needed, If symptoms worsen          Medications Prescribed:  Current Discharge Medication List        START taking these medications    Details   oseltamivir 75 MG Oral Cap Take 1 capsule (75 mg total) by mouth 2 (two) times daily for 5 days.  Qty: 10 capsule, Refills: 0      benzonatate 100 MG Oral Cap Take 1 capsule (100 mg total) by mouth 3 (three) times daily as needed for cough.  Qty: 30 capsule, Refills: 0                 Supplementary Documentation:

## 2024-12-11 NOTE — DISCHARGE INSTRUCTIONS
Return for new or worsening symptoms such as increased shortness of breath, persistent fever, worsening weakness

## 2024-12-12 LAB
ATRIAL RATE: 66 BPM
P AXIS: 31 DEGREES
P-R INTERVAL: 150 MS
Q-T INTERVAL: 388 MS
QRS DURATION: 108 MS
QTC CALCULATION (BEZET): 406 MS
R AXIS: -29 DEGREES
T AXIS: 35 DEGREES
VENTRICULAR RATE: 66 BPM

## 2024-12-12 RX ORDER — ATORVASTATIN CALCIUM 40 MG/1
40 TABLET, FILM COATED ORAL NIGHTLY
Qty: 30 TABLET | Refills: 0 | Status: SHIPPED | OUTPATIENT
Start: 2024-12-12 | End: 2024-12-17

## 2024-12-12 RX ORDER — LISINOPRIL 10 MG/1
10 TABLET ORAL DAILY
Qty: 30 TABLET | Refills: 0 | Status: SHIPPED | OUTPATIENT
Start: 2024-12-12 | End: 2024-12-17

## 2024-12-12 NOTE — TELEPHONE ENCOUNTER
Requesting    Name from pharmacy: Lisinopril 10 MG Oral Tablet         Will file in chart as: LISINOPRIL 10 MG Oral Tab    Sig: Take 1 tablet by mouth once daily    Disp: 30 tablet    Refills: 0    Start: 12/11/2024    Class: Normal    For: Hypertension, unspecified type; History of CVA (cerebrovascular accident)    Last ordered: 1 month ago (11/7/2024) by Katharine Grant MD    Last refill: 11/7/2024    Rx #: [id number]    Hypertension Medications Protocol Vgtffm6712/11/2024 12:17 PM   Protocol Details Last BP reading less than 140/90    CMP or BMP in past 12 months    In person appointment or virtual visit in the past 12 mos or appointment in next 3 mos    EGFRCR or GFRNAA > 50       Name from pharmacy: Atorvastatin Calcium 40 MG Oral Tablet         Will file in chart as: ATORVASTATIN 40 MG Oral Tab    Sig: Take 1 tablet by mouth nightly    Disp: 30 tablet    Refills: 0    Start: 12/11/2024    Class: Normal    For: History of CVA (cerebrovascular accident), Hyperlipidemia LDL goal <70    Last ordered: 1 month ago (11/7/2024) by Katharine Grant MD    Last refill: 11/7/2024    Rx #: [id number]    Cholesterol Medication Protocol Skljod6512/11/2024 12:17 PM   Protocol Details ALT < 80    Lipid panel within past 12 months    ALT resulted within past year    In person appointment or virtual visit in the past 12 mos or appointment in next 3 mos      To be filled at: [facility] [address] [place] [address] [phone], [phone]     LOV: 10/03/23 w/ DWAYNE  RTC: No Follow Up on File   Last Relevant Labs: 12/10/24    Future Appointments   Date Time Provider Department Center   12/17/2024  3:40 PM Katharine Marroquin MD [facility]

## 2024-12-17 ENCOUNTER — OFFICE VISIT (OUTPATIENT)
Dept: INTERNAL MEDICINE CLINIC | Facility: CLINIC | Age: 64
End: 2024-12-17
Payer: COMMERCIAL

## 2024-12-17 VITALS
OXYGEN SATURATION: 95 % | HEART RATE: 68 BPM | SYSTOLIC BLOOD PRESSURE: 120 MMHG | BODY MASS INDEX: 28.92 KG/M2 | HEIGHT: 68 IN | TEMPERATURE: 98 F | DIASTOLIC BLOOD PRESSURE: 60 MMHG | WEIGHT: 190.81 LBS

## 2024-12-17 DIAGNOSIS — R39.11 BENIGN PROSTATIC HYPERPLASIA WITH URINARY HESITANCY: ICD-10-CM

## 2024-12-17 DIAGNOSIS — I20.89 ANGINA OF EFFORT (HCC): ICD-10-CM

## 2024-12-17 DIAGNOSIS — M20.009 FINGER DEFORMITY: ICD-10-CM

## 2024-12-17 DIAGNOSIS — E78.5 HYPERLIPIDEMIA LDL GOAL <70: ICD-10-CM

## 2024-12-17 DIAGNOSIS — Z12.11 COLON CANCER SCREENING: ICD-10-CM

## 2024-12-17 DIAGNOSIS — Z00.00 ANNUAL PHYSICAL EXAM: Primary | ICD-10-CM

## 2024-12-17 DIAGNOSIS — R94.31 ABNORMAL EKG: ICD-10-CM

## 2024-12-17 DIAGNOSIS — Z86.73 HISTORY OF CVA (CEREBROVASCULAR ACCIDENT): ICD-10-CM

## 2024-12-17 DIAGNOSIS — N40.1 BENIGN PROSTATIC HYPERPLASIA WITH URINARY HESITANCY: ICD-10-CM

## 2024-12-17 DIAGNOSIS — I10 HYPERTENSION, UNSPECIFIED TYPE: ICD-10-CM

## 2024-12-17 DIAGNOSIS — J31.0 RHINITIS, UNSPECIFIED TYPE: ICD-10-CM

## 2024-12-17 DIAGNOSIS — R79.89 ELEVATED LFTS: ICD-10-CM

## 2024-12-17 DIAGNOSIS — Z01.00 ENCOUNTER FOR COMPLETE EYE EXAM: ICD-10-CM

## 2024-12-17 PROCEDURE — 3008F BODY MASS INDEX DOCD: CPT | Performed by: INTERNAL MEDICINE

## 2024-12-17 PROCEDURE — 99397 PER PM REEVAL EST PAT 65+ YR: CPT | Performed by: INTERNAL MEDICINE

## 2024-12-17 PROCEDURE — 3078F DIAST BP <80 MM HG: CPT | Performed by: INTERNAL MEDICINE

## 2024-12-17 PROCEDURE — 3074F SYST BP LT 130 MM HG: CPT | Performed by: INTERNAL MEDICINE

## 2024-12-17 PROCEDURE — 90471 IMMUNIZATION ADMIN: CPT | Performed by: INTERNAL MEDICINE

## 2024-12-17 PROCEDURE — 99214 OFFICE O/P EST MOD 30 MIN: CPT | Performed by: INTERNAL MEDICINE

## 2024-12-17 PROCEDURE — 90662 IIV NO PRSV INCREASED AG IM: CPT | Performed by: INTERNAL MEDICINE

## 2024-12-17 RX ORDER — TAMSULOSIN HYDROCHLORIDE 0.4 MG/1
0.8 CAPSULE ORAL NIGHTLY
Qty: 180 CAPSULE | Refills: 3 | Status: SHIPPED | OUTPATIENT
Start: 2024-12-17

## 2024-12-17 RX ORDER — ATORVASTATIN CALCIUM 40 MG/1
40 TABLET, FILM COATED ORAL NIGHTLY
Qty: 90 TABLET | Refills: 0 | Status: SHIPPED | OUTPATIENT
Start: 2024-12-17

## 2024-12-17 RX ORDER — FINASTERIDE 5 MG/1
5 TABLET, FILM COATED ORAL DAILY
Qty: 90 TABLET | Refills: 3 | Status: SHIPPED | OUTPATIENT
Start: 2024-12-17

## 2024-12-17 RX ORDER — LISINOPRIL 10 MG/1
10 TABLET ORAL DAILY
Qty: 90 TABLET | Refills: 0 | Status: SHIPPED | OUTPATIENT
Start: 2024-12-17

## 2024-12-17 RX ORDER — FLUTICASONE PROPIONATE 50 MCG
2 SPRAY, SUSPENSION (ML) NASAL DAILY
Qty: 18.2 ML | Refills: 2 | Status: SHIPPED | OUTPATIENT
Start: 2024-12-17

## 2024-12-17 NOTE — PROGRESS NOTES
Subjective:   Otilio Akhtar is a 64 year old male  who presents for Physical (Reviewed Preventative/Wellness form with patient. )     Reports doing well overall.   HTN-controlled   Possible angina/ALVAREZ at times.  HLD- on statin. Due for labs   Elevated LFTs- to check labs     Denies immediate family hx of breast or colon or prostate cancer.  Denies /GI symptoms     Reminded to make eye exam appt   Remainder of ROS negative      History/Other:    Chief Complaint Reviewed and Verified  Nursing Notes Reviewed and   Verified  Tobacco Reviewed  Allergies Reviewed  Medications Reviewed    Medical History Reviewed  Surgical History Reviewed  Family History   Reviewed  Social History Reviewed         Current Outpatient Medications   Medication Sig Dispense Refill    lisinopril 10 MG Oral Tab Take 1 tablet (10 mg total) by mouth daily. 90 tablet 0    fluticasone propionate 50 MCG/ACT Nasal Suspension 2 sprays by Each Nare route daily. 18.2 mL 2    finasteride 5 MG Oral Tab Take 1 tablet (5 mg total) by mouth daily. 90 tablet 3    atorvastatin 40 MG Oral Tab Take 1 tablet (40 mg total) by mouth nightly. 90 tablet 0    tamsulosin 0.4 MG Oral Cap Take 2 capsules (0.8 mg total) by mouth nightly. AT BEDTIME 180 capsule 3    aspirin 325 MG Oral Tab Take 1 tablet (325 mg total) by mouth daily. 30 tablet 0       Review of Systems:  Pertinent items are noted in HPI.  A comprehensive 10 point review of systems was completed.  Pertinent positives and negatives noted in the the HPI.        Objective:   /60 (BP Location: Left arm, Patient Position: Sitting, Cuff Size: adult)   Pulse 68   Temp 98.1 °F (36.7 °C) (Temporal)   Ht 5' 8\" (1.727 m)   Wt 190 lb 12.8 oz (86.5 kg)   SpO2 95%   BMI 29.01 kg/m²  Estimated body mass index is 29.01 kg/m² as calculated from the following:    Height as of this encounter: 5' 8\" (1.727 m).    Weight as of this encounter: 190 lb 12.8 oz (86.5 kg).  PHYSICAL EXAM:   General: no acute  distress   Eyes: pupils equal and reactive; EOMI; sclera normal; conjunctiva normal   ENT:without erythema or exudate  Neck: trachea midline; no adenopathy; thyroid not enlarged   Hematologic/lymphatic:no cervical lymphadenopathy; no supraclavicular adenopathy   Respiratory: no increased work of breathing; good air exchange; CTAB; no crackles or wheezing   Cardiovascular: RRR; S1, S2; no murmurs; no carotid bruits; no edema   Gastrointestinal: normal bowel sounds; soft; non-distended; non-tender  Neurological: awake, alert, oriented x3; CNII-XII grossly intact;  MSK: full ROM; strength 5/5  Left 5th finger with bony/cyst like deformity. Per pt sometimes discharge is expelled   Behavioral/Psych: euthymic; appropriate affect       Assessment & Plan:   Otilio was seen today for physical.    Diagnoses and all orders for this visit:    Annual physical exam  -check labs  -cologua    Hypertension, unspecified type  -     lisinopril 10 MG Oral Tab; Take 1 tablet (10 mg total) by mouth daily.    Elevated LFTs  -labs    Hyperlipidemia LDL goal <70  -     atorvastatin 40 MG Oral Tab; Take 1 tablet (40 mg total) by mouth nightly.  Cmp/lipid panel     Colon cancer screening  -     [facility]    History of CVA (cerebrovascular accident)  -     lisinopril 10 MG Oral Tab; Take 1 tablet (10 mg total) by mouth daily.  -     atorvastatin 40 MG Oral Tab; Take 1 tablet (40 mg total) by mouth nightly.  -aspirin     Rhinitis, unspecified type  -     fluticasone propionate 50 MCG/ACT Nasal Suspension; 2 sprays by Each Nare route daily.    Benign prostatic hyperplasia with urinary hesitancy  -     finasteride 5 MG Oral Tab; Take 1 tablet (5 mg total) by mouth daily.  -     tamsulosin 0.4 MG Oral Cap; Take 2 capsules (0.8 mg total) by mouth nightly. AT BEDTIME    Abnormal EKG  Angina of effort (HCC)  -     CARD NUC STRESS TEST LEXISCAN (CPT 86604/28640/); Future  -     [facility] CARDIOLOGY  EXTERNAL    Encounter for complete eye exam  -     Ophthalmology Referral - In Network    Other orders    -     High Dose Fluzone trivalent influenza, 65 yrs+ PFS [82070]      Finger deformity/cyst- to see edgardo Grant MD

## 2024-12-19 ENCOUNTER — TELEPHONE (OUTPATIENT)
Dept: INTERNAL MEDICINE CLINIC | Facility: CLINIC | Age: 64
End: 2024-12-19

## 2024-12-19 NOTE — TELEPHONE ENCOUNTER
I faxed over Cologuard Order with attached ID and INS Card   STATUS CP  Placed original to scan copy in accordion with confirmation.

## 2024-12-21 ENCOUNTER — LAB ENCOUNTER (OUTPATIENT)
Dept: LAB | Age: 64
End: 2024-12-21
Attending: INTERNAL MEDICINE
Payer: COMMERCIAL

## 2024-12-21 DIAGNOSIS — I10 HYPERTENSION, UNSPECIFIED TYPE: ICD-10-CM

## 2024-12-21 DIAGNOSIS — Z01.89 ROUTINE LAB DRAW: ICD-10-CM

## 2024-12-21 LAB
ALBUMIN SERPL-MCNC: 4.5 G/DL (ref 3.2–4.8)
ALBUMIN/GLOB SERPL: 1.2 {RATIO} (ref 1–2)
ALP LIVER SERPL-CCNC: 67 U/L
ALT SERPL-CCNC: 130 U/L
ANION GAP SERPL CALC-SCNC: 6 MMOL/L (ref 0–18)
AST SERPL-CCNC: 45 U/L (ref ?–34)
BASOPHILS # BLD AUTO: 0.09 X10(3) UL (ref 0–0.2)
BASOPHILS NFR BLD AUTO: 1 %
BILIRUB SERPL-MCNC: 1.2 MG/DL (ref 0.2–1.1)
BUN BLD-MCNC: 20 MG/DL (ref 9–23)
CALCIUM BLD-MCNC: 9.6 MG/DL (ref 8.7–10.4)
CHLORIDE SERPL-SCNC: 106 MMOL/L (ref 98–112)
CHOLEST SERPL-MCNC: 142 MG/DL (ref ?–200)
CO2 SERPL-SCNC: 28 MMOL/L (ref 21–32)
COMPLEXED PSA SERPL-MCNC: 0.72 NG/ML (ref ?–4)
CREAT BLD-MCNC: 1.06 MG/DL
EGFRCR SERPLBLD CKD-EPI 2021: 77 ML/MIN/1.73M2 (ref 60–?)
EOSINOPHIL # BLD AUTO: 0.26 X10(3) UL (ref 0–0.7)
EOSINOPHIL NFR BLD AUTO: 3 %
ERYTHROCYTE [DISTWIDTH] IN BLOOD BY AUTOMATED COUNT: 13.3 %
EST. AVERAGE GLUCOSE BLD GHB EST-MCNC: 131 MG/DL (ref 68–126)
FASTING PATIENT LIPID ANSWER: YES
FASTING STATUS PATIENT QL REPORTED: YES
GLOBULIN PLAS-MCNC: 3.8 G/DL (ref 2–3.5)
GLUCOSE BLD-MCNC: 83 MG/DL (ref 70–99)
HBA1C MFR BLD: 6.2 % (ref ?–5.7)
HCT VFR BLD AUTO: 43.6 %
HDLC SERPL-MCNC: 47 MG/DL (ref 40–59)
HGB BLD-MCNC: 14.4 G/DL
IMM GRANULOCYTES # BLD AUTO: 0.04 X10(3) UL (ref 0–1)
IMM GRANULOCYTES NFR BLD: 0.5 %
LDLC SERPL CALC-MCNC: 80 MG/DL (ref ?–100)
LYMPHOCYTES # BLD AUTO: 2.44 X10(3) UL (ref 1–4)
LYMPHOCYTES NFR BLD AUTO: 28 %
MCH RBC QN AUTO: 30.5 PG (ref 26–34)
MCHC RBC AUTO-ENTMCNC: 33 G/DL (ref 31–37)
MCV RBC AUTO: 92.4 FL
MONOCYTES # BLD AUTO: 0.63 X10(3) UL (ref 0.1–1)
MONOCYTES NFR BLD AUTO: 7.2 %
NEUTROPHILS # BLD AUTO: 5.24 X10 (3) UL (ref 1.5–7.7)
NEUTROPHILS # BLD AUTO: 5.24 X10(3) UL (ref 1.5–7.7)
NEUTROPHILS NFR BLD AUTO: 60.3 %
NONHDLC SERPL-MCNC: 95 MG/DL (ref ?–130)
OSMOLALITY SERPL CALC.SUM OF ELEC: 292 MOSM/KG (ref 275–295)
PLATELET # BLD AUTO: 249 10(3)UL (ref 150–450)
POTASSIUM SERPL-SCNC: 4.1 MMOL/L (ref 3.5–5.1)
PROT SERPL-MCNC: 8.3 G/DL (ref 5.7–8.2)
RBC # BLD AUTO: 4.72 X10(6)UL
SODIUM SERPL-SCNC: 140 MMOL/L (ref 136–145)
TRIGL SERPL-MCNC: 76 MG/DL (ref 30–149)
TSI SER-ACNC: 0.57 UIU/ML (ref 0.55–4.78)
VIT D+METAB SERPL-MCNC: 20.8 NG/ML (ref 30–100)
VLDLC SERPL CALC-MCNC: 12 MG/DL (ref 0–30)
WBC # BLD AUTO: 8.7 X10(3) UL (ref 4–11)

## 2024-12-21 PROCEDURE — 83036 HEMOGLOBIN GLYCOSYLATED A1C: CPT | Performed by: INTERNAL MEDICINE

## 2024-12-21 PROCEDURE — 84153 ASSAY OF PSA TOTAL: CPT | Performed by: INTERNAL MEDICINE

## 2024-12-21 PROCEDURE — 82306 VITAMIN D 25 HYDROXY: CPT | Performed by: INTERNAL MEDICINE

## 2024-12-21 PROCEDURE — 80061 LIPID PANEL: CPT | Performed by: INTERNAL MEDICINE

## 2024-12-21 PROCEDURE — 80050 GENERAL HEALTH PANEL: CPT | Performed by: INTERNAL MEDICINE

## 2024-12-22 DIAGNOSIS — R79.89 ELEVATED LFTS: Primary | ICD-10-CM

## 2024-12-22 DIAGNOSIS — R17 ELEVATED BILIRUBIN: ICD-10-CM

## 2024-12-22 DIAGNOSIS — R73.9 HYPERGLYCEMIA: ICD-10-CM

## 2024-12-31 ENCOUNTER — HOSPITAL ENCOUNTER (OUTPATIENT)
Dept: CV DIAGNOSTICS | Facility: HOSPITAL | Age: 64
Discharge: HOME | End: 2024-12-31
Attending: INTERNAL MEDICINE
Payer: COMMERCIAL

## 2024-12-31 DIAGNOSIS — R94.31 ABNORMAL EKG: ICD-10-CM

## 2024-12-31 DIAGNOSIS — I20.89 ANGINA OF EFFORT (HCC): ICD-10-CM

## 2024-12-31 PROCEDURE — 93018 CV STRESS TEST I&R ONLY: CPT | Performed by: INTERNAL MEDICINE

## 2024-12-31 PROCEDURE — 78452 HT MUSCLE IMAGE SPECT MULT: CPT | Performed by: INTERNAL MEDICINE

## 2024-12-31 PROCEDURE — 93017 CV STRESS TEST TRACING ONLY: CPT | Performed by: INTERNAL MEDICINE

## 2024-12-31 RX ORDER — REGADENOSON 0.08 MG/ML
INJECTION, SOLUTION INTRAVENOUS
Status: COMPLETED
Start: 2024-12-31 | End: 2024-12-31

## 2024-12-31 RX ADMIN — REGADENOSON 0.4 MG: 0.08 INJECTION, SOLUTION INTRAVENOUS at 10:45:00

## 2025-01-06 LAB — AMB EXT COLOGUARD RESULT: POSITIVE

## 2025-01-10 ENCOUNTER — TELEPHONE (OUTPATIENT)
Dept: INTERNAL MEDICINE CLINIC | Facility: CLINIC | Age: 65
End: 2025-01-10

## 2025-01-10 DIAGNOSIS — R19.5 POSITIVE COLORECTAL CANCER SCREENING USING COLOGUARD TEST: Primary | ICD-10-CM

## 2025-01-10 NOTE — TELEPHONE ENCOUNTER
Incoming fax from [facility] with cologuard result   Result- Positive   Placed in in-basket for review

## 2025-01-10 NOTE — TELEPHONE ENCOUNTER
Jose  [facility]# [phone]    Called to confirm results where received by office.     Confirmed.

## 2025-01-11 NOTE — TELEPHONE ENCOUNTER
Please let pt know Cologuard positive. Needs to see GI for further colon cancer screening with colonoscopy. Referral placed.

## 2025-03-25 DIAGNOSIS — Z86.73 HISTORY OF CVA (CEREBROVASCULAR ACCIDENT): ICD-10-CM

## 2025-03-25 DIAGNOSIS — I10 HYPERTENSION, UNSPECIFIED TYPE: ICD-10-CM

## 2025-03-26 DIAGNOSIS — N40.1 BENIGN PROSTATIC HYPERPLASIA WITH URINARY HESITANCY: ICD-10-CM

## 2025-03-26 DIAGNOSIS — R39.11 BENIGN PROSTATIC HYPERPLASIA WITH URINARY HESITANCY: ICD-10-CM

## 2025-03-26 RX ORDER — LISINOPRIL 10 MG/1
10 TABLET ORAL DAILY
Qty: 90 TABLET | Refills: 1 | Status: SHIPPED | OUTPATIENT
Start: 2025-03-26

## 2025-03-26 NOTE — TELEPHONE ENCOUNTER
Requesting    Name from pharmacy: LISINOPRIL 10MG TABLETS         Will file in chart as: LISINOPRIL 10 MG Oral Tab    Sig: TAKE 1 TABLET BY MOUTH ONCE A DAY    Disp: 30 tablet    Refills: 0 (Pharmacy requested: Not specified)    Start: 3/25/2025    Class: Normal    Non-formulary For: Hypertension, unspecified type; History of CVA (cerebrovascular accident)    Last ordered: 3 months ago (12/17/2024) by Katharine Grant MD    Last refill: 2/22/2025    Rx #: [id number]    Hypertension Medications Protocol Paqrce4503/25/2025 03:09 PM   Protocol Details CMP or BMP in past 12 months    Last BP reading less than 140/90    In person appointment or virtual visit in the past 12 mos or appointment in next 3 mos    EGFRCR or GFRNAA > 50    Medication is active on med list      To be filled at: [facility] #[id number] [place] [address] [address] AT [facility] [address], [phone], [phone]     LOV: 12/17/24 w/ DWAYNE  RTC: 03/17/25  Last Relevant Labs: 12/21/24  No future appointments.

## 2025-03-26 NOTE — TELEPHONE ENCOUNTER
Pt requesting medication refill. finasteride 5 MG Oral Tab - tamsulosin 0.4 MG Oral Cap Store #: [facility] #[id number]     Pt says he has about one week's worth of medication left.    LOV: 12/17/24    No future appointments.

## 2025-03-27 RX ORDER — FINASTERIDE 5 MG/1
5 TABLET, FILM COATED ORAL DAILY
Qty: 90 TABLET | Refills: 3 | Status: SHIPPED | OUTPATIENT
Start: 2025-03-27

## 2025-03-27 NOTE — TELEPHONE ENCOUNTER
Requesting    finasteride 5 MG Oral Tab         Sig: Take 1 tablet (5 mg total) by mouth daily.    Disp: 90 tablet    Refills: 3    Start: 3/26/2025    Class: Normal    For: Benign prostatic hyperplasia with urinary hesitancy    Last ordered: 3 months ago (12/17/2024) by Katharine Grant MD    Genitourinary Medications Kultqt3203/26/2025 04:04 PM   Protocol Details Patient does not have pulmonary hypertension on problem list    In person appointment or virtual visit in the past 12 mos or appointment in next 3 mos    Medication is active on med list      To be filled at: [id number] [place], [place] - [facility] [address] AT [facility] [address], [phone], [phone]          LOV: 12/17/24 w/ DWAYNE  RTC: 03/17/25  Last Relevant Labs: 12/21/24  No future appointments.

## (undated) NOTE — LETTER
[facility] [address], [address]  Consent for Procedure/Sedation    Date: 10/27/2021    Time:      1. I authorize the performance upon [address] [place] the following:  Transesophageal echocardiogram     2.  I authorize Dr. Velasquezand w Medical Record #: [id number]

## (undated) NOTE — LETTER
12/16/21        [facility]  [address] [place] [phone]      Dear Navjot,    [address] [place] [address] records indicate that you have outstanding lab work and or testing that was ordered for you and has not yet been completed:  Orders Placed This Encounter

## (undated) NOTE — LETTER
1. I authorize the performance upon [address] the following:  Transesophageal Echocardiogram    2. I authorize Dr. Radu Hoskins  (and whomever is designated as the doctor’s assistant), to perform the above mentioned procedures.     3. If any unforeseen con